# Patient Record
Sex: FEMALE | Employment: UNEMPLOYED | URBAN - METROPOLITAN AREA
[De-identification: names, ages, dates, MRNs, and addresses within clinical notes are randomized per-mention and may not be internally consistent; named-entity substitution may affect disease eponyms.]

---

## 2017-10-06 PROBLEM — I49.9 IRREGULAR HEART RATE: Status: ACTIVE | Noted: 2017-10-06

## 2017-10-06 PROBLEM — I95.9 HYPOTENSION: Status: ACTIVE | Noted: 2017-10-06

## 2017-10-06 PROBLEM — K57.30 DIVERTICULOSIS OF LARGE INTESTINE: Status: ACTIVE | Noted: 2017-10-06

## 2018-06-05 PROBLEM — L81.9 DISCOLORATION OF SKIN OF FOOT: Status: ACTIVE | Noted: 2018-06-05

## 2019-05-30 ENCOUNTER — TELEPHONE (OUTPATIENT)
Dept: PRIMARY CARE CLINIC | Age: 59
End: 2019-05-30

## 2019-05-30 RX ORDER — AMOXICILLIN 500 MG/1
500 CAPSULE ORAL 3 TIMES DAILY
Qty: 30 CAPSULE | Refills: 0 | Status: SHIPPED | OUTPATIENT
Start: 2019-05-30 | End: 2019-05-30

## 2019-05-30 NOTE — TELEPHONE ENCOUNTER
Dr. Garcia Catching pt. Started with a sore throat on Mon. Slight sore throat now from sinus drainage, facial pain and pressure. No fever. Asking if something can be sent in, or what you suggest? [phone].  Last appt 6/5/18

## 2019-05-30 NOTE — TELEPHONE ENCOUNTER
Amoxicillin sent to [facility]. Also do nasal saline 2x/day. Also recommend she make an appointment for check up.

## 2019-07-15 ENCOUNTER — OFFICE VISIT (OUTPATIENT)
Dept: PRIMARY CARE CLINIC | Age: 59
End: 2019-07-15
Payer: COMMERCIAL

## 2019-07-15 VITALS
HEIGHT: 68 IN | WEIGHT: 176.4 LBS | OXYGEN SATURATION: 98 % | DIASTOLIC BLOOD PRESSURE: 76 MMHG | BODY MASS INDEX: 26.73 KG/M2 | HEART RATE: 76 BPM | SYSTOLIC BLOOD PRESSURE: 128 MMHG

## 2019-07-15 DIAGNOSIS — I49.9 IRREGULAR HEART RATE: ICD-10-CM

## 2019-07-15 DIAGNOSIS — I49.3 FREQUENT PVCS: ICD-10-CM

## 2019-07-15 DIAGNOSIS — Z12.31 ENCOUNTER FOR SCREENING MAMMOGRAM FOR MALIGNANT NEOPLASM OF BREAST: ICD-10-CM

## 2019-07-15 DIAGNOSIS — Z13.220 ENCOUNTER FOR LIPID SCREENING FOR CARDIOVASCULAR DISEASE: ICD-10-CM

## 2019-07-15 DIAGNOSIS — Z13.6 ENCOUNTER FOR LIPID SCREENING FOR CARDIOVASCULAR DISEASE: ICD-10-CM

## 2019-07-15 DIAGNOSIS — R55 NEAR SYNCOPE: Primary | ICD-10-CM

## 2019-07-15 DIAGNOSIS — N95.1 MENOPAUSAL SYMPTOMS: ICD-10-CM

## 2019-07-15 PROCEDURE — 99214 OFFICE O/P EST MOD 30 MIN: CPT | Performed by: FAMILY MEDICINE

## 2019-07-15 PROCEDURE — 3017F COLORECTAL CA SCREEN DOC REV: CPT | Performed by: FAMILY MEDICINE

## 2019-07-15 PROCEDURE — G8419 CALC BMI OUT NRM PARAM NOF/U: HCPCS | Performed by: FAMILY MEDICINE

## 2019-07-15 PROCEDURE — 1036F TOBACCO NON-USER: CPT | Performed by: FAMILY MEDICINE

## 2019-07-15 PROCEDURE — G8427 DOCREV CUR MEDS BY ELIG CLIN: HCPCS | Performed by: FAMILY MEDICINE

## 2019-07-15 RX ORDER — IMIPRAMINE HCL 50 MG
50 TABLET ORAL NIGHTLY
Qty: 90 TABLET | Refills: 3 | Status: SHIPPED | OUTPATIENT
Start: 2019-07-15 | End: 2020-06-26 | Stop reason: SDUPTHER

## 2019-07-15 RX ORDER — METOPROLOL SUCCINATE 50 MG/1
25 TABLET, EXTENDED RELEASE ORAL DAILY
Qty: 45 TABLET | Refills: 3 | Status: SHIPPED | OUTPATIENT
Start: 2019-07-15 | End: 2020-08-11

## 2019-07-15 ASSESSMENT — ENCOUNTER SYMPTOMS
RHINORRHEA: 0
EYE DISCHARGE: 0
NAUSEA: 0
WHEEZING: 0
COUGH: 0
ABDOMINAL PAIN: 0
DIARRHEA: 0
SHORTNESS OF BREATH: 0
SORE THROAT: 0
EYE REDNESS: 0
VOMITING: 0

## 2019-07-15 ASSESSMENT — PATIENT HEALTH QUESTIONNAIRE - PHQ9
SUM OF ALL RESPONSES TO PHQ QUESTIONS 1-9: 2
SUM OF ALL RESPONSES TO PHQ9 QUESTIONS 1 & 2: 2
SUM OF ALL RESPONSES TO PHQ QUESTIONS 1-9: 2
1. LITTLE INTEREST OR PLEASURE IN DOING THINGS: 1
2. FEELING DOWN, DEPRESSED OR HOPELESS: 1

## 2019-07-15 NOTE — PROGRESS NOTES
time.   Skin: Skin is warm. No rash noted. Psychiatric: She has a normal mood and affect. Her behavior is normal. Thought content normal.   Nursing note and vitals reviewed. Assessment:       Diagnosis Orders   1. Near syncope  Basic Metabolic Panel, Fasting    CBC Auto Differential   2. Encounter for lipid screening for cardiovascular disease  Lipid, Fasting   3. Irregular heart rate  TSH    T4, Free   4. Encounter for screening mammogram for malignant neoplasm of breast  KARRIE DIGITAL SCREEN W OR WO CAD BILATERAL   5. Frequent PVCs  metoprolol succinate (TOPROL XL) 50 MG extended release tablet   6. Menopausal symptoms  DEXA BONE DENSITY AXIAL SKELETON        Plan:    Labs ordered. Mammogram ordered. DEXA scan ordered. She had work-up before for rapid heartbeat. If near syncope recurs, will repeat echo, 24-hour Holter monitor, and carotid ultrasound    Return in about 6 months (around 1/15/2020).     Orders Placed This Encounter   Procedures    KARRIE DIGITAL SCREEN W OR WO CAD BILATERAL     Standing Status:   Future     Standing Expiration Date:   9/15/2020     Order Specific Question:   Reason for exam:     Answer:   mammogram decline    DEXA BONE DENSITY AXIAL SKELETON     Standing Status:   Future     Standing Expiration Date:   7/15/2020     Order Specific Question:   Reason for exam:     Answer:   screen    Basic Metabolic Panel, Fasting     Standing Status:   Future     Standing Expiration Date:   7/15/2020    Lipid, Fasting     Standing Status:   Future     Standing Expiration Date:   7/15/2020    TSH     Standing Status:   Future     Standing Expiration Date:   7/15/2020    T4, Free     Standing Status:   Future     Standing Expiration Date:   7/15/2020    CBC Auto Differential     Standing Status:   Future     Standing Expiration Date:   7/15/2020     Orders Placed This Encounter   Medications    imipramine (TOFRANIL) 50 MG tablet     Sig: Take 1 tablet by mouth nightly     Dispense:  90

## 2019-07-16 LAB
ABSOLUTE BASO #: 0 K/UL (ref 0–0.1)
ABSOLUTE EOS #: 0.1 K/UL (ref 0.1–0.4)
ABSOLUTE LYMPH #: 1.2 K/UL (ref 0.8–5.2)
ABSOLUTE MONO #: 0.3 K/UL (ref 0.1–0.9)
ABSOLUTE NEUT #: 2.8 K/UL (ref 1.3–9.1)
ANION GAP SERPL CALCULATED.3IONS-SCNC: 10 MMOL/L (ref 4–12)
BASOPHILS RELATIVE PERCENT: 0.7 %
BUN BLDV-MCNC: 15 MG/DL (ref 5–23)
CALCIUM SERPL-MCNC: 9.7 MG/DL (ref 8.5–10.5)
CHLORIDE BLD-SCNC: 102 MMOL/L (ref 98–109)
CHOLESTEROL/HDL RATIO: 4.3 (ref 1–5)
CHOLESTEROL: 238 MG/DL (ref 150–200)
CO2: 26 MMOL/L (ref 22–32)
CREAT SERPL-MCNC: 0.76 MG/DL (ref 0.4–1)
EGFR AFRICAN AMERICAN: >60 ML/MIN/1.73SQ.M
EGFR IF NONAFRICAN AMERICAN: >60 ML/MIN/1.73SQ.M
EOSINOPHILS RELATIVE PERCENT: 2.4 %
GLUCOSE: 102 MG/DL (ref 65–99)
HCT VFR BLD CALC: 37.8 % (ref 36–48)
HDLC SERPL-MCNC: 56 MG/DL
HEMOGLOBIN: 13.2 G/DL (ref 12–16)
LDL CHOLESTEROL CALCULATED: 162 MG/DL
LYMPHOCYTE %: 27.3 %
MCH RBC QN AUTO: 32.1 PG (ref 27–34)
MCHC RBC AUTO-ENTMCNC: 34.9 G/DL (ref 31–36)
MCV RBC AUTO: 92 FL (ref 80–100)
MONOCYTES # BLD: 6.9 %
NEUTROPHILS RELATIVE PERCENT: 62.5 %
PDW BLD-RTO: 12.5 % (ref 10.8–14.8)
PLATELETS: 211 K/UL (ref 150–450)
POTASSIUM SERPL-SCNC: 4.3 MMOL/L (ref 3.5–5)
RBC: 4.11 M/UL (ref 4–5.5)
SODIUM BLD-SCNC: 138 MMOL/L (ref 134–146)
T4 FREE: 0.72 NG/DL (ref 0.61–1.6)
TRIGL SERPL-MCNC: 100 MG/DL (ref 27–150)
TSH SERPL DL<=0.05 MIU/L-ACNC: 1.48 UIU/ML (ref 0.49–4.67)
VLDLC SERPL CALC-MCNC: 20 MG/DL (ref 0–30)
WBC: 4.5 K/UL (ref 3.7–10.8)

## 2019-07-17 DIAGNOSIS — E78.00 PURE HYPERCHOLESTEROLEMIA: Primary | ICD-10-CM

## 2019-08-27 ENCOUNTER — PROCEDURE VISIT (OUTPATIENT)
Dept: PRIMARY CARE CLINIC | Age: 59
End: 2019-08-27
Payer: COMMERCIAL

## 2019-08-27 DIAGNOSIS — N95.1 MENOPAUSAL SYMPTOMS: ICD-10-CM

## 2019-08-27 PROCEDURE — 77080 DXA BONE DENSITY AXIAL: CPT | Performed by: FAMILY MEDICINE

## 2020-01-21 ENCOUNTER — OFFICE VISIT (OUTPATIENT)
Dept: PRIMARY CARE CLINIC | Age: 60
End: 2020-01-21
Payer: COMMERCIAL

## 2020-01-21 ENCOUNTER — HOSPITAL ENCOUNTER (OUTPATIENT)
Age: 60
Setting detail: SPECIMEN
Discharge: HOME | End: 2020-01-21
Payer: COMMERCIAL

## 2020-01-21 VITALS
WEIGHT: 178.8 LBS | BODY MASS INDEX: 27.15 KG/M2 | HEART RATE: 66 BPM | SYSTOLIC BLOOD PRESSURE: 112 MMHG | DIASTOLIC BLOOD PRESSURE: 84 MMHG

## 2020-01-21 LAB
ABSOLUTE EOS #: 0.13 K/UL (ref 0–0.44)
ABSOLUTE IMMATURE GRANULOCYTE: <0.03 K/UL (ref 0–0.3)
ABSOLUTE LYMPH #: 1.42 K/UL (ref 1.1–3.7)
ABSOLUTE MONO #: 0.56 K/UL (ref 0.1–1.2)
ALBUMIN SERPL-MCNC: 4.7 G/DL (ref 3.5–5.2)
ALBUMIN/GLOBULIN RATIO: 1.4 (ref 1–2.5)
ALP BLD-CCNC: 72 U/L (ref 35–104)
ALT SERPL-CCNC: 22 U/L (ref 5–33)
AMYLASE: 42 U/L (ref 28–100)
AST SERPL-CCNC: 24 U/L
BASOPHILS # BLD: 1 % (ref 0–2)
BASOPHILS ABSOLUTE: 0.04 K/UL (ref 0–0.2)
BILIRUB SERPL-MCNC: 0.62 MG/DL (ref 0.3–1.2)
BILIRUBIN DIRECT: 0.13 MG/DL
BILIRUBIN, INDIRECT: 0.49 MG/DL (ref 0–1)
DIFFERENTIAL TYPE: ABNORMAL
EOSINOPHILS RELATIVE PERCENT: 2 % (ref 1–4)
GLOBULIN: NORMAL G/DL (ref 1.5–3.8)
HCT VFR BLD CALC: 39.8 % (ref 36.3–47.1)
HEMOGLOBIN: 13.6 G/DL (ref 11.9–15.1)
IMMATURE GRANULOCYTES: 0 %
LIPASE: 23 U/L (ref 13–60)
LYMPHOCYTES # BLD: 19 % (ref 24–43)
MCH RBC QN AUTO: 31.9 PG (ref 25.2–33.5)
MCHC RBC AUTO-ENTMCNC: 34.2 G/DL (ref 28.4–34.8)
MCV RBC AUTO: 93.4 FL (ref 82.6–102.9)
MONOCYTES # BLD: 8 % (ref 3–12)
NRBC AUTOMATED: 0 PER 100 WBC
PDW BLD-RTO: 12.2 % (ref 11.8–14.4)
PLATELET # BLD: 247 K/UL (ref 138–453)
PLATELET ESTIMATE: ABNORMAL
PMV BLD AUTO: 10.3 FL (ref 8.1–13.5)
RBC # BLD: 4.26 M/UL (ref 3.95–5.11)
RBC # BLD: ABNORMAL 10*6/UL
SEG NEUTROPHILS: 70 % (ref 36–65)
SEGMENTED NEUTROPHILS ABSOLUTE COUNT: 5.33 K/UL (ref 1.5–8.1)
TOTAL PROTEIN: 8 G/DL (ref 6.4–8.3)
WBC # BLD: 7.5 K/UL (ref 3.5–11.3)
WBC # BLD: ABNORMAL 10*3/UL

## 2020-01-21 PROCEDURE — G8427 DOCREV CUR MEDS BY ELIG CLIN: HCPCS | Performed by: PHYSICIAN ASSISTANT

## 2020-01-21 PROCEDURE — 99213 OFFICE O/P EST LOW 20 MIN: CPT | Performed by: PHYSICIAN ASSISTANT

## 2020-01-21 PROCEDURE — G8419 CALC BMI OUT NRM PARAM NOF/U: HCPCS | Performed by: PHYSICIAN ASSISTANT

## 2020-01-21 PROCEDURE — G8484 FLU IMMUNIZE NO ADMIN: HCPCS | Performed by: PHYSICIAN ASSISTANT

## 2020-01-21 PROCEDURE — 3017F COLORECTAL CA SCREEN DOC REV: CPT | Performed by: PHYSICIAN ASSISTANT

## 2020-01-21 PROCEDURE — 1036F TOBACCO NON-USER: CPT | Performed by: PHYSICIAN ASSISTANT

## 2020-01-21 RX ORDER — OMEPRAZOLE 20 MG/1
20 CAPSULE, DELAYED RELEASE ORAL DAILY
Qty: 30 CAPSULE | Refills: 1 | Status: SHIPPED | OUTPATIENT
Start: 2020-01-21 | End: 2020-03-09 | Stop reason: SDUPTHER

## 2020-01-21 ASSESSMENT — PATIENT HEALTH QUESTIONNAIRE - PHQ9
1. LITTLE INTEREST OR PLEASURE IN DOING THINGS: 0
SUM OF ALL RESPONSES TO PHQ QUESTIONS 1-9: 0
SUM OF ALL RESPONSES TO PHQ QUESTIONS 1-9: 0
2. FEELING DOWN, DEPRESSED OR HOPELESS: 0
SUM OF ALL RESPONSES TO PHQ9 QUESTIONS 1 & 2: 0

## 2020-01-21 NOTE — PROGRESS NOTES
Substance Use Topics    Alcohol use: Yes     Comment: ocassioanly      Current Outpatient Medications   Medication Sig Dispense Refill    omeprazole (PRILOSEC) 20 MG delayed release capsule Take 1 capsule by mouth Daily 30 capsule 1    imipramine (TOFRANIL) 50 MG tablet Take 1 tablet by mouth nightly 90 tablet 3    metoprolol succinate (TOPROL XL) 50 MG extended release tablet Take 0.5 tablets by mouth daily 45 tablet 3    timolol (TIMOPTIC-XE) 0.5 % ophthalmic gel-forming Apply to eye      SUMAtriptan (IMITREX) 25 MG tablet Take 25 mg by mouth once as needed for Migraine       No current facility-administered medications for this visit. Allergies   Allergen Reactions    Meperidine     Morphine        Health Maintenance   Topic Date Due    DTaP/Tdap/Td vaccine (1 - Tdap) 07/12/1972    Diabetes screen  07/12/2001    Shingles Vaccine (1 of 2) 07/12/2011    Breast cancer screen  08/26/2018    Flu vaccine (1) 01/21/2021 (Originally 9/1/2019)    Lipid screen  07/15/2024    Colon cancer screen colonoscopy  01/30/2027    Hepatitis C screen  Completed    HIV screen  Completed    Pneumococcal 0-64 years Vaccine  Aged Out       Subjective:      Review of Systems   Constitutional: Negative for fatigue. Gastrointestinal: Positive for abdominal pain and nausea. Negative for blood in stool, diarrhea and vomiting. Objective:     /84 (Site: Left Upper Arm, Position: Sitting, Cuff Size: Large Adult)   Pulse 66   Wt 178 lb 12.8 oz (81.1 kg)   BMI 27.15 kg/m²   Physical Exam  Vitals signs and nursing note reviewed. Constitutional:       Appearance: Normal appearance. HENT:      Head: Normocephalic and atraumatic. Cardiovascular:      Rate and Rhythm: Normal rate and regular rhythm. Pulmonary:      Effort: Pulmonary effort is normal.      Breath sounds: Normal breath sounds. Abdominal:      General: Abdomen is flat. Bowel sounds are normal. There is no distension.       Tenderness:

## 2020-01-27 ASSESSMENT — ENCOUNTER SYMPTOMS
NAUSEA: 1
DIARRHEA: 0
BLOOD IN STOOL: 0
ABDOMINAL PAIN: 1
VOMITING: 0

## 2020-03-09 ENCOUNTER — OFFICE VISIT (OUTPATIENT)
Dept: PRIMARY CARE CLINIC | Age: 60
End: 2020-03-09
Payer: COMMERCIAL

## 2020-03-09 VITALS
TEMPERATURE: 98 F | HEART RATE: 80 BPM | SYSTOLIC BLOOD PRESSURE: 134 MMHG | WEIGHT: 179 LBS | OXYGEN SATURATION: 99 % | DIASTOLIC BLOOD PRESSURE: 82 MMHG | BODY MASS INDEX: 27.18 KG/M2

## 2020-03-09 PROBLEM — B02.9 HERPES ZOSTER WITHOUT COMPLICATION: Status: ACTIVE | Noted: 2020-03-09

## 2020-03-09 PROBLEM — H02.053: Status: ACTIVE | Noted: 2018-03-22

## 2020-03-09 PROBLEM — H26.9 CATARACTS, BILATERAL: Status: ACTIVE | Noted: 2018-04-20

## 2020-03-09 PROCEDURE — 3017F COLORECTAL CA SCREEN DOC REV: CPT | Performed by: PHYSICIAN ASSISTANT

## 2020-03-09 PROCEDURE — 99213 OFFICE O/P EST LOW 20 MIN: CPT | Performed by: PHYSICIAN ASSISTANT

## 2020-03-09 PROCEDURE — G8419 CALC BMI OUT NRM PARAM NOF/U: HCPCS | Performed by: PHYSICIAN ASSISTANT

## 2020-03-09 PROCEDURE — G8484 FLU IMMUNIZE NO ADMIN: HCPCS | Performed by: PHYSICIAN ASSISTANT

## 2020-03-09 PROCEDURE — 1036F TOBACCO NON-USER: CPT | Performed by: PHYSICIAN ASSISTANT

## 2020-03-09 PROCEDURE — G8427 DOCREV CUR MEDS BY ELIG CLIN: HCPCS | Performed by: PHYSICIAN ASSISTANT

## 2020-03-09 RX ORDER — OMEPRAZOLE 20 MG/1
20 CAPSULE, DELAYED RELEASE ORAL DAILY
Qty: 90 CAPSULE | Refills: 1 | Status: SHIPPED | OUTPATIENT
Start: 2020-03-09 | End: 2020-03-13

## 2020-03-09 RX ORDER — VALACYCLOVIR HYDROCHLORIDE 1 G/1
1000 TABLET, FILM COATED ORAL 3 TIMES DAILY
Qty: 21 TABLET | Refills: 0 | Status: SHIPPED | OUTPATIENT
Start: 2020-03-09 | End: 2021-06-07 | Stop reason: ALTCHOICE

## 2020-03-09 ASSESSMENT — ENCOUNTER SYMPTOMS
GASTROINTESTINAL NEGATIVE: 1
RESPIRATORY NEGATIVE: 1

## 2020-03-09 NOTE — PROGRESS NOTES
[address]  Phone: [phone]  Fax: [phone]    Patricia Mon is a 63 y.o. female who presents today for her medical conditions/complaintsas noted below. Chief Complaint   Patient presents with    Rash     patient has a rash that started on her left side of stomach and spread down to left thigh, rash started 2 weeks ago. patient said the rash is painful, patient thinks she may have shingles. HPI:     HPI  Rash started Saturday and has been worn down about 3-4 days before that  Did have chickenpox as a child  No other exposures  NO one else has this around her. No fever. Current Outpatient Medications   Medication Sig Dispense Refill    omeprazole (PRILOSEC) 20 MG delayed release capsule Take 1 capsule by mouth Daily 90 capsule 1    valACYclovir (VALTREX) 1 g tablet Take 1 tablet by mouth 3 times daily For shingles 21 tablet 0    imipramine (TOFRANIL) 50 MG tablet Take 1 tablet by mouth nightly 90 tablet 3    metoprolol succinate (TOPROL XL) 50 MG extended release tablet Take 0.5 tablets by mouth daily 45 tablet 3    timolol (TIMOPTIC-XE) 0.5 % ophthalmic gel-forming Apply to eye      SUMAtriptan (IMITREX) 25 MG tablet Take 25 mg by mouth once as needed for Migraine       No current facility-administered medications for this visit. Allergies   Allergen Reactions    Meperidine     Morphine        Subjective:      Review of Systems   Constitutional: Negative for chills, diaphoresis and fever. Respiratory: Negative. Cardiovascular: Negative. Gastrointestinal: Negative. Genitourinary: Negative. Skin: Positive for rash. Objective:     /82   Pulse 80   Temp 98 °F (36.7 °C)   Wt 179 lb (81.2 kg)   SpO2 99%   BMI 27.18 kg/m²   Physical Exam  Vitals signs and nursing note reviewed. Constitutional:       Appearance: Normal appearance.    Cardiovascular:      Rate and Rhythm: Normal

## 2020-03-13 RX ORDER — OMEPRAZOLE 20 MG/1
CAPSULE, DELAYED RELEASE ORAL
Qty: 30 CAPSULE | Refills: 1 | Status: SHIPPED | OUTPATIENT
Start: 2020-03-13 | End: 2020-06-22 | Stop reason: SDUPTHER

## 2020-06-23 RX ORDER — OMEPRAZOLE 20 MG/1
CAPSULE, DELAYED RELEASE ORAL
Qty: 30 CAPSULE | Refills: 1 | Status: SHIPPED | OUTPATIENT
Start: 2020-06-23 | End: 2021-06-07

## 2020-06-26 RX ORDER — IMIPRAMINE HCL 50 MG
50 TABLET ORAL NIGHTLY
Qty: 90 TABLET | Refills: 3 | Status: SHIPPED | OUTPATIENT
Start: 2020-06-26 | End: 2021-06-13 | Stop reason: DRUGHIGH

## 2020-08-11 RX ORDER — METOPROLOL SUCCINATE 50 MG/1
TABLET, EXTENDED RELEASE ORAL
Qty: 45 TABLET | Refills: 3 | Status: SHIPPED | OUTPATIENT
Start: 2020-08-11 | End: 2021-06-07 | Stop reason: SDUPTHER

## 2020-11-13 ENCOUNTER — TELEPHONE (OUTPATIENT)
Dept: PRIMARY CARE CLINIC | Age: 60
End: 2020-11-13

## 2020-11-13 RX ORDER — FLUCONAZOLE 150 MG/1
150 TABLET ORAL ONCE
Qty: 1 TABLET | Refills: 0 | Status: SHIPPED | OUTPATIENT
Start: 2020-11-13 | End: 2020-11-13

## 2020-11-13 NOTE — TELEPHONE ENCOUNTER
Left voicemail notifying patient that medications were sent in - to schedule an appointment if sxs don't get worse.

## 2020-11-13 NOTE — TELEPHONE ENCOUNTER
Pt went to the urgent care on 11/1/2020 for a cat bite and was put a antibiotic. Pt states she now has a yeast infection. Pt c/o itching and burning x2 days. Pt would like something sent in     Please advise     [facility].

## 2021-06-07 ENCOUNTER — HOSPITAL ENCOUNTER (OUTPATIENT)
Age: 61
Setting detail: SPECIMEN
Discharge: HOME | End: 2021-06-07
Payer: COMMERCIAL

## 2021-06-07 ENCOUNTER — OFFICE VISIT (OUTPATIENT)
Dept: PRIMARY CARE CLINIC | Age: 61
End: 2021-06-07
Payer: COMMERCIAL

## 2021-06-07 VITALS
BODY MASS INDEX: 27.12 KG/M2 | OXYGEN SATURATION: 98 % | HEART RATE: 73 BPM | DIASTOLIC BLOOD PRESSURE: 82 MMHG | WEIGHT: 178.6 LBS | SYSTOLIC BLOOD PRESSURE: 118 MMHG

## 2021-06-07 DIAGNOSIS — Z13.1 SCREENING FOR DIABETES MELLITUS: ICD-10-CM

## 2021-06-07 DIAGNOSIS — I49.3 FREQUENT PVCS: Primary | ICD-10-CM

## 2021-06-07 DIAGNOSIS — E78.00 HIGH CHOLESTEROL: ICD-10-CM

## 2021-06-07 DIAGNOSIS — Z12.31 ENCOUNTER FOR SCREENING MAMMOGRAM FOR BREAST CANCER: ICD-10-CM

## 2021-06-07 DIAGNOSIS — R53.83 FATIGUE, UNSPECIFIED TYPE: ICD-10-CM

## 2021-06-07 DIAGNOSIS — I49.3 FREQUENT PVCS: ICD-10-CM

## 2021-06-07 DIAGNOSIS — Z13.31 POSITIVE DEPRESSION SCREENING: ICD-10-CM

## 2021-06-07 DIAGNOSIS — F32.A DEPRESSION, UNSPECIFIED DEPRESSION TYPE: ICD-10-CM

## 2021-06-07 DIAGNOSIS — R41.3 MEMORY DIFFICULTIES: ICD-10-CM

## 2021-06-07 DIAGNOSIS — G43.709 CHRONIC MIGRAINE WITHOUT AURA WITHOUT STATUS MIGRAINOSUS, NOT INTRACTABLE: ICD-10-CM

## 2021-06-07 DIAGNOSIS — Z23 NEED FOR VACCINATION: ICD-10-CM

## 2021-06-07 LAB
ABSOLUTE EOS #: 0.12 K/UL (ref 0–0.44)
ABSOLUTE IMMATURE GRANULOCYTE: <0.03 K/UL (ref 0–0.3)
ABSOLUTE LYMPH #: 1.51 K/UL (ref 1.1–3.7)
ABSOLUTE MONO #: 0.36 K/UL (ref 0.1–1.2)
ALBUMIN SERPL-MCNC: 4.8 G/DL (ref 3.5–5.2)
ALBUMIN/GLOBULIN RATIO: 1.5 (ref 1–2.5)
ALP BLD-CCNC: 74 U/L (ref 35–104)
ALT SERPL-CCNC: 21 U/L (ref 5–33)
ANION GAP SERPL CALCULATED.3IONS-SCNC: 9 MMOL/L (ref 9–17)
AST SERPL-CCNC: 24 U/L
BASOPHILS # BLD: 0 % (ref 0–2)
BASOPHILS ABSOLUTE: <0.03 K/UL (ref 0–0.2)
BILIRUB SERPL-MCNC: 0.72 MG/DL (ref 0.3–1.2)
BUN BLDV-MCNC: 13 MG/DL (ref 6–20)
BUN/CREAT BLD: ABNORMAL (ref 9–20)
CALCIUM SERPL-MCNC: 9.5 MG/DL (ref 8.6–10.4)
CHLORIDE BLD-SCNC: 99 MMOL/L (ref 98–107)
CHOLESTEROL, FASTING: 236 MG/DL
CHOLESTEROL/HDL RATIO: 4.6
CO2: 25 MMOL/L (ref 20–31)
CREAT SERPL-MCNC: 0.65 MG/DL (ref 0.5–0.9)
DIFFERENTIAL TYPE: NORMAL
EOSINOPHILS RELATIVE PERCENT: 2 % (ref 1–4)
FOLATE: >20 NG/ML
GFR AFRICAN AMERICAN: >60 ML/MIN
GFR NON-AFRICAN AMERICAN: >60 ML/MIN
GFR SERPL CREATININE-BSD FRML MDRD: ABNORMAL ML/MIN/{1.73_M2}
GFR SERPL CREATININE-BSD FRML MDRD: ABNORMAL ML/MIN/{1.73_M2}
GLUCOSE FASTING: 91 MG/DL (ref 70–99)
HCT VFR BLD CALC: 41.5 % (ref 36.3–47.1)
HDLC SERPL-MCNC: 51 MG/DL
HEMOGLOBIN: 14.1 G/DL (ref 11.9–15.1)
IMMATURE GRANULOCYTES: 0 %
LDL CHOLESTEROL: 163 MG/DL (ref 0–130)
LYMPHOCYTES # BLD: 30 % (ref 24–43)
MAGNESIUM: 2 MG/DL (ref 1.6–2.6)
MCH RBC QN AUTO: 31.6 PG (ref 25.2–33.5)
MCHC RBC AUTO-ENTMCNC: 34 G/DL (ref 28.4–34.8)
MCV RBC AUTO: 93 FL (ref 82.6–102.9)
MONOCYTES # BLD: 7 % (ref 3–12)
NRBC AUTOMATED: 0 PER 100 WBC
PDW BLD-RTO: 12.6 % (ref 11.8–14.4)
PLATELET # BLD: 229 K/UL (ref 138–453)
PLATELET ESTIMATE: NORMAL
PMV BLD AUTO: 10.2 FL (ref 8.1–13.5)
POTASSIUM SERPL-SCNC: 4.5 MMOL/L (ref 3.7–5.3)
RBC # BLD: 4.46 M/UL (ref 3.95–5.11)
RBC # BLD: NORMAL 10*6/UL
SEG NEUTROPHILS: 61 % (ref 36–65)
SEGMENTED NEUTROPHILS ABSOLUTE COUNT: 2.96 K/UL (ref 1.5–8.1)
SODIUM BLD-SCNC: 133 MMOL/L (ref 135–144)
TOTAL PROTEIN: 8.1 G/DL (ref 6.4–8.3)
TRIGLYCERIDE, FASTING: 112 MG/DL
TSH SERPL DL<=0.05 MIU/L-ACNC: 1.53 MIU/L (ref 0.3–5)
VITAMIN B-12: 546 PG/ML (ref 232–1245)
VITAMIN D 25-HYDROXY: 33.1 NG/ML (ref 30–100)
VLDLC SERPL CALC-MCNC: ABNORMAL MG/DL (ref 1–30)
WBC # BLD: 5 K/UL (ref 3.5–11.3)
WBC # BLD: NORMAL 10*3/UL

## 2021-06-07 PROCEDURE — 3017F COLORECTAL CA SCREEN DOC REV: CPT | Performed by: PHYSICIAN ASSISTANT

## 2021-06-07 PROCEDURE — 90471 IMMUNIZATION ADMIN: CPT | Performed by: PHYSICIAN ASSISTANT

## 2021-06-07 PROCEDURE — 99214 OFFICE O/P EST MOD 30 MIN: CPT | Performed by: PHYSICIAN ASSISTANT

## 2021-06-07 PROCEDURE — 1036F TOBACCO NON-USER: CPT | Performed by: PHYSICIAN ASSISTANT

## 2021-06-07 PROCEDURE — G8427 DOCREV CUR MEDS BY ELIG CLIN: HCPCS | Performed by: PHYSICIAN ASSISTANT

## 2021-06-07 PROCEDURE — 90715 TDAP VACCINE 7 YRS/> IM: CPT | Performed by: PHYSICIAN ASSISTANT

## 2021-06-07 PROCEDURE — G8431 POS CLIN DEPRES SCRN F/U DOC: HCPCS | Performed by: PHYSICIAN ASSISTANT

## 2021-06-07 PROCEDURE — G8419 CALC BMI OUT NRM PARAM NOF/U: HCPCS | Performed by: PHYSICIAN ASSISTANT

## 2021-06-07 RX ORDER — IMIPRAMINE HCL 50 MG
50 TABLET ORAL NIGHTLY
Qty: 90 TABLET | Refills: 3 | Status: CANCELLED | OUTPATIENT
Start: 2021-06-07 | End: 2021-09-05

## 2021-06-07 RX ORDER — IMIPRAMINE PAMOATE 75 MG
75 CAPSULE ORAL NIGHTLY
Qty: 90 CAPSULE | Refills: 1 | Status: SHIPPED | OUTPATIENT
Start: 2021-06-07 | End: 2021-11-26 | Stop reason: SDUPTHER

## 2021-06-07 RX ORDER — METOPROLOL SUCCINATE 25 MG/1
12.5 TABLET, EXTENDED RELEASE ORAL DAILY
Qty: 45 TABLET | Refills: 3 | Status: SHIPPED | OUTPATIENT
Start: 2021-06-07 | End: 2021-11-26 | Stop reason: SDUPTHER

## 2021-06-07 RX ORDER — SUMATRIPTAN 25 MG/1
25 TABLET, FILM COATED ORAL
Qty: 27 TABLET | Refills: 3 | Status: SHIPPED | OUTPATIENT
Start: 2021-06-07 | End: 2021-11-26 | Stop reason: SDUPTHER

## 2021-06-07 SDOH — ECONOMIC STABILITY: FOOD INSECURITY: WITHIN THE PAST 12 MONTHS, THE FOOD YOU BOUGHT JUST DIDN'T LAST AND YOU DIDN'T HAVE MONEY TO GET MORE.: NEVER TRUE

## 2021-06-07 SDOH — ECONOMIC STABILITY: FOOD INSECURITY: WITHIN THE PAST 12 MONTHS, YOU WORRIED THAT YOUR FOOD WOULD RUN OUT BEFORE YOU GOT MONEY TO BUY MORE.: NEVER TRUE

## 2021-06-07 ASSESSMENT — PATIENT HEALTH QUESTIONNAIRE - PHQ9
2. FEELING DOWN, DEPRESSED OR HOPELESS: 2
SUM OF ALL RESPONSES TO PHQ9 QUESTIONS 1 & 2: 3
7. TROUBLE CONCENTRATING ON THINGS, SUCH AS READING THE NEWSPAPER OR WATCHING TELEVISION: 1
9. THOUGHTS THAT YOU WOULD BE BETTER OFF DEAD, OR OF HURTING YOURSELF: 0
8. MOVING OR SPEAKING SO SLOWLY THAT OTHER PEOPLE COULD HAVE NOTICED. OR THE OPPOSITE, BEING SO FIGETY OR RESTLESS THAT YOU HAVE BEEN MOVING AROUND A LOT MORE THAN USUAL: 1
SUM OF ALL RESPONSES TO PHQ QUESTIONS 1-9: 11
4. FEELING TIRED OR HAVING LITTLE ENERGY: 3
SUM OF ALL RESPONSES TO PHQ QUESTIONS 1-9: 11
5. POOR APPETITE OR OVEREATING: 0
1. LITTLE INTEREST OR PLEASURE IN DOING THINGS: 1
6. FEELING BAD ABOUT YOURSELF - OR THAT YOU ARE A FAILURE OR HAVE LET YOURSELF OR YOUR FAMILY DOWN: 0
10. IF YOU CHECKED OFF ANY PROBLEMS, HOW DIFFICULT HAVE THESE PROBLEMS MADE IT FOR YOU TO DO YOUR WORK, TAKE CARE OF THINGS AT HOME, OR GET ALONG WITH OTHER PEOPLE: 1
3. TROUBLE FALLING OR STAYING ASLEEP: 3
SUM OF ALL RESPONSES TO PHQ QUESTIONS 1-9: 11
DEPRESSION UNABLE TO ASSESS: URGENT/EMERGENT SITUATION

## 2021-06-07 ASSESSMENT — ENCOUNTER SYMPTOMS: SHORTNESS OF BREATH: 0

## 2021-06-07 ASSESSMENT — SOCIAL DETERMINANTS OF HEALTH (SDOH): HOW HARD IS IT FOR YOU TO PAY FOR THE VERY BASICS LIKE FOOD, HOUSING, MEDICAL CARE, AND HEATING?: NOT HARD AT ALL

## 2021-06-07 NOTE — PROGRESS NOTES
[facility]  [address] [place] [id number]  Dept: [id number] Tomasz is a 62 y.o. female Established patient, who presents today for her medical conditions/complaints as noted below. Chief Complaint   Patient presents with    Medication Check       HPI:     HPI   HX of PVCs/irregular HR:   Complains that she thinks her BP gets too low (around 99/75) with the metoprolol, pulse . Denies lightheadedness, just feels weak when she takes it. Says she drinks lot of water. Fatigue:  Not feeling well, not herself x 6 months. Trouble sleeping every night, trouble staying asleep, tosses and turn after 2 hours. Claims she only slept 2 hrs last night. Sometimes watches TV before bed. Snoring, but not loudly, no wintnessed apnea. Did not think melatonin helped. No hx of anemia, no blood in stool. Drinks almond milk with cereal, Taking 2000 IU of Vit D. Does not go in the sun since she has hx of skin cancer (sees dermatology 2x per year). Feels depressed because she is not feeling well. She is also depressed because her  is an alcoholic and her brother who lives next door to her is very negative. Pt said her Mother had Alzheimer's disease. Pt says she notices she has decreased memory, such as forgetting people's names. Migraines:  Takes imipramine for migraine prevention. Gets them less than once per month usually. Had 2 in one month a couple months ago, but imitrex works well to abort them. Gets black spots in vision as aura. Nausea if the migraines are bad. Takes tums for GERD 4x per week, but she is not will to take anything else for GERD since Zantac was pulled from the market (so she thinks all other acid meds are dangerous too). Not interested in COVID vaccine and Shingrix is too expensive.      Reviewed prior notes None  Reviewed previous Labs from 2019 and 2020    LDL Cholesterol (mg/dL) 06/07/2026    DTaP/Tdap/Td vaccine (2 - Td or Tdap) 06/07/2031    Hepatitis C screen  Completed    HIV screen  Completed    Hepatitis A vaccine  Aged Out    Hepatitis B vaccine  Aged Out    Hib vaccine  Aged Out    Meningococcal (ACWY) vaccine  Aged Out    Pneumococcal 0-64 years Vaccine  Aged Out       Subjective:      Review of Systems   Constitutional: Positive for fatigue. Respiratory: Negative for shortness of breath. Cardiovascular: Positive for palpitations (once per week). Negative for chest pain. Gastrointestinal:        +GERD   Neurological: Positive for weakness (generalized) and headaches. Negative for light-headedness. Psychiatric/Behavioral: Positive for dysphoric mood and sleep disturbance. Objective:     /82   Pulse 73   Wt 178 lb 9.6 oz (81 kg)   SpO2 98%   BMI 27.12 kg/m²   Physical Exam  Vitals and nursing note reviewed. Constitutional:       Appearance: Normal appearance. HENT:      Head: Normocephalic and atraumatic. Neck:      Vascular: No carotid bruit. Cardiovascular:      Rate and Rhythm: Normal rate and regular rhythm. Comments: Trace edema bilat  Pulmonary:      Effort: Pulmonary effort is normal.      Breath sounds: Normal breath sounds. Neurological:      Mental Status: She is alert. Assessment/Plan:   1. Frequent PVCs  -     metoprolol succinate (TOPROL XL) 25 MG extended release tablet; Take 0.5 tablets by mouth daily, Disp-45 tablet, R-3Requesting 1 year supplyNormal  (Pt thinks the metoprolol causes her to have low BP and weakness, so will try decreasing dose from 25 mg to 12.5 mg daily, but advised pt to let office know if palpitations became too frequent with dosage change). -     TSH With Reflex Ft4; Future  -     Magnesium; Future  -     Comprehensive Metabolic Panel, Fasting; Future  2. Fatigue, unspecified type  -     TSH With Reflex Ft4; Future  -     CBC Auto Differential; Future  -     Vitamin D 25 Hydroxy;  Future  - Vitamin B12 & Folate; Future  3. Chronic migraine without aura without status migrainosus, not intractable  -     SUMAtriptan (IMITREX) 25 MG tablet; Take 1 tablet by mouth once as needed for Migraine, Disp-27 tablet, R-3Normal  -     imipramine (TOFRANIL-PM) 75 MG capsule; Take 1 capsule by mouth nightly, Disp-90 capsule, R-1Normal  4. Depression, unspecified depression type  -     imipramine (TOFRANIL-PM) 75 MG capsule; Take 1 capsule by mouth nightly, Disp-90 capsule, R-1Normal  Pt did not want any new medications, but was willing to try increasing the imipramine from 50 mg to 75 mg  5. Positive depression screening  -     Positive Screen for Clinical Depression with a Documented Follow-up Plan   6. Memory difficulties  -     Vitamin B12 & Folate; Future  7. High cholesterol  -     Lipid, Fasting; Future   -Pt's Father had a MI at age 61  -     Comprehensive Metabolic Panel, Fasting; Future  8. Screening for diabetes mellitus  -     Comprehensive Metabolic Panel, Fasting; Future  9. Encounter for screening mammogram for breast cancer  -     [facility] IRIS DIGITAL SCREEN BILATERAL; Future  10. Need for vaccination  -     Tdap (age 6y and older) IM ([address])       On the basis of positive PHQ-9 screening (PHQ-9 Total Score: 11), the following plan was implemented: Dosage increase of imipramine. Patient will follow-up in 6 month(s) with PCP or sooner as needed. Return in about 6 months (around 12/7/2021) for PVCs, depression, cholesterol [facility].     Orders Placed This Encounter   Procedures    KARRIE IRIS DIGITAL SCREEN BILATERAL     Standing Status:   Future     Standing Expiration Date:   6/7/2022     Order Specific Question:   Reason for exam:     Answer:   screening, Paternal aunt had breast cancer in her 52's    Tdap (age 6y and older) IM ([address])    TSH With Reflex Ft4     Standing Status:   Future     Number of Occurrences:   1     Standing Expiration Date:   6/7/2022    CBC Auto Differential     Standing Status:   Future     Number of Occurrences:   1     Standing Expiration Date:   6/8/2022    Vitamin D 25 Hydroxy     Standing Status:   Future     Number of Occurrences:   1     Standing Expiration Date:   6/7/2022    Vitamin B12 & Folate     Standing Status:   Future     Number of Occurrences:   1     Standing Expiration Date:   6/7/2022    Lipid, Fasting     Standing Status:   Future     Number of Occurrences:   1     Standing Expiration Date:   6/7/2022    Magnesium     Standing Status:   Future     Number of Occurrences:   1     Standing Expiration Date:   6/7/2022    Comprehensive Metabolic Panel, Fasting     Standing Status:   Future     Number of Occurrences:   1     Standing Expiration Date:   6/7/2022    Positive Screen for Clinical Depression with a Documented Follow-up Plan      Orders Placed This Encounter   Medications    metoprolol succinate (TOPROL XL) 25 MG extended release tablet     Sig: Take 0.5 tablets by mouth daily     Dispense:  45 tablet     Refill:  3     Requesting 1 year supply    SUMAtriptan (IMITREX) 25 MG tablet     Sig: Take 1 tablet by mouth once as needed for Migraine     Dispense:  27 tablet     Refill:  3    imipramine (TOFRANIL-PM) 75 MG capsule     Sig: Take 1 capsule by mouth nightly     Dispense:  90 capsule     Refill:  1       Patient given educational materials - see patient instructions. Discussed use, benefit, and side effects of prescribed medications. All patient questions answered. Pt voiced understanding. Reviewed health maintenance. Instructed to continue current medications, diet and exercise. Patient agreed with treatment plan. Follow up as directed.      Electronically signed by Leena Mendoza PA-C on 6/13/2021 at 11:37 PM

## 2021-06-13 PROBLEM — K21.9 GERD (GASTROESOPHAGEAL REFLUX DISEASE): Status: ACTIVE | Noted: 2021-06-13

## 2021-06-13 PROBLEM — E78.00 HIGH CHOLESTEROL: Status: ACTIVE | Noted: 2021-06-13

## 2021-06-13 PROBLEM — I49.3 FREQUENT PVCS: Status: ACTIVE | Noted: 2021-06-13

## 2021-06-13 PROBLEM — G43.709 CHRONIC MIGRAINE WITHOUT AURA WITHOUT STATUS MIGRAINOSUS, NOT INTRACTABLE: Status: ACTIVE | Noted: 2021-06-13

## 2021-06-13 PROBLEM — F32.A DEPRESSION: Status: ACTIVE | Noted: 2021-06-13

## 2021-06-13 RX ORDER — CHOLECALCIFEROL (VITAMIN D3) 50 MCG
2000 TABLET ORAL DAILY
COMMUNITY

## 2021-07-30 ENCOUNTER — TELEPHONE (OUTPATIENT)
Dept: FAMILY MEDICINE CLINIC | Age: 61
End: 2021-07-30

## 2021-10-29 ENCOUNTER — OFFICE VISIT (OUTPATIENT)
Dept: PRIMARY CARE CLINIC | Age: 61
End: 2021-10-29

## 2021-10-29 ENCOUNTER — NURSE TRIAGE (OUTPATIENT)
Dept: OTHER | Facility: CLINIC | Age: 61
End: 2021-10-29

## 2021-10-29 ENCOUNTER — TELEPHONE (OUTPATIENT)
Dept: PRIMARY CARE CLINIC | Age: 61
End: 2021-10-29

## 2021-10-29 VITALS
SYSTOLIC BLOOD PRESSURE: 132 MMHG | HEIGHT: 67 IN | HEART RATE: 86 BPM | DIASTOLIC BLOOD PRESSURE: 80 MMHG | BODY MASS INDEX: 28.88 KG/M2 | OXYGEN SATURATION: 98 % | WEIGHT: 184 LBS

## 2021-10-29 DIAGNOSIS — M79.604 PAIN OF RIGHT LOWER EXTREMITY: ICD-10-CM

## 2021-10-29 PROCEDURE — 99213 OFFICE O/P EST LOW 20 MIN: CPT | Performed by: FAMILY MEDICINE

## 2021-10-29 ASSESSMENT — ENCOUNTER SYMPTOMS
COUGH: 0
SHORTNESS OF BREATH: 0

## 2021-10-29 NOTE — TELEPHONE ENCOUNTER
----- Message from Erica Pina Dr sent at 10/29/2021 11:42 AM EDT -----  Subject: Appointment Request    Reason for Call: Semi-Routine Return from RN Triage    QUESTIONS  Type of Appointment? Established Patient  Reason for appointment request? No appointments available during search  Additional Information for Provider? Nurse triaged PT needs see today   (Friday) or monday for RT calf pain. No appts available, please contact   the pt to advise  ---------------------------------------------------------------------------  --------------  CALL BACK INFO  What is the best way for the office to contact you? OK to leave message on   voicemail  Preferred Call Back Phone Number? [phone]  ---------------------------------------------------------------------------  --------------  SCRIPT ANSWERS  Patient needs to be seen today? No  Patient needs to be seen today or tomorrow? No  Patient needs to be seen within 3 days? No  Patient needs to be seen within 5 days? Yes   Patient can be seen for a routine visit? No  Nurse Name? Emma Garner Nurse king  Have you been diagnosed with, awaiting test results for, or told that you   are suspected of having COVID-19 (Coronavirus)? (If patient has tested   negative or was tested as a requirement for work, school, or travel and   not based on symptoms, answer no)? No  Within the past two weeks have you developed any of the following symptoms   (answer no if symptoms have been present longer than 2 weeks or began   more than 2 weeks ago)? Fever or Chills, Cough, Shortness of breath or   difficulty breathing, Loss of taste or smell, Sore throat, Nasal   congestion, Sneezing or runny nose, Fatigue or generalized body aches   (answer no if pain is specific to a body part e.g. back pain), Diarrhea,   Headache? No  Have you had close contact with someone with COVID-19 in the last 14 days? No  (Service Expert  click yes below to proceed with [employer]   Scheduling)?  Yes

## 2021-10-29 NOTE — TELEPHONE ENCOUNTER
Reason for Disposition   Thigh or calf pain in only one leg and present > 1 hour    Answer Assessment - Initial Assessment Questions  1. MAIN CONCERN OR SYMPTOM:  \"What is your main concern right now? \" \"What question do you have? \" \"What's the main symptom you're worried about? \" (e.g., fever, pain, redness, swelling)      Right leg pain, no swelling, no redness. 2. VACCINE: \"What vaccination did you receive? \" \"Is this your first or second shot? \" (e.g., none; AstraZeneca, J&J, Gosia Maloney, Dameon Morales, other)      10/20 Moderna     3. SYMPTOM ONSET: \"When did the pain begin? \" (e.g., not relevant; hours, days)       Day after vaccine, she had stabbing pain in leg. On and off pain. 4. SYMPTOM SEVERITY: \"How bad is it? \"       Feels like a muscle strain 2/10    5. FEVER: \"Is there a fever? \" If so, ask: \"What is it, how was it measured, and when did it start? \"       No     6. PAST REACTIONS: \"Have you reacted to immunizations before? \" If so, ask: \"What happened? \"      No     7. OTHER SYMPTOMS: \"Do you have any other symptoms? \"      No    Answer Assessment - Initial Assessment Questions  1. ONSET: \"When did the pain start? \"       10/21    2. LOCATION: \"Where is the pain located? \"       Right leg calf. 3. PAIN: \"How bad is the pain? \"    (Scale 1-10; or mild, moderate, severe)    -  MILD (1-3): doesn't interfere with normal activities     -  MODERATE (4-7): interferes with normal activities (e.g., work or school) or awakens from sleep, limping     -  SEVERE (8-10): excruciating pain, unable to do any normal activities, unable to walk      2/10    4. WORK OR EXERCISE: \"Has there been any recent work or exercise that involved this part of the body? \"       No     5. CAUSE: \"What do you think is causing the leg pain? \"      Covid Vaccine    6. OTHER SYMPTOMS: \"Do you have any other symptoms? \" (e.g., chest pain, back pain, breathing difficulty, swelling, rash, fever, numbness, weakness)      No       7.  PREGNANCY: \"Is there any chance you are pregnant? \" \"When was your last menstrual period? \"      No    Protocols used: LEG PAIN-ADULT-OH, CORONAVIRUS (COVID-19) VACCINE QUESTIONS AND REACTIONS-ADULT-AH    Received call from [address] at [facility] with [employer] Complaint. Brief description of triage: Calf pain. Triage indicates for patient to be seen today. Reaching out to office for 2nd level triage, speaking with Dyana Larson and approved office visit today or Monday. She can use heat or gentle stretching. Care advice provided, patient verbalizes understanding; denies any other questions or concerns; instructed to call back for any new or worsening symptoms. Writer provided warm transfer to Ravin hyatt at [facility] for appointment scheduling. Attention Provider: Thank you for allowing me to participate in the care of your patient. The patient was connected to triage in response to information provided to the ECC/PSC. Please do not respond through this encounter as the response is not directed to a shared pool.

## 2021-10-29 NOTE — TELEPHONE ENCOUNTER
----- Message from Madvenue Dk sent at 10/29/2021  2:03 PM EDT -----  Subject: Appointment Request    Reason for Call: Urgent (Patient Request) No Script    QUESTIONS  Type of Appointment? Established Patient  Reason for appointment request? Available appointments did not meet   patient need  Additional Information for Provider? Pt would like to be seen today. Pt   was transferred to [facility] this morning complaining of right leg pain. Pt has   been having right leg pain for 9 days since she had her second Maderna   shot. She started getting it that evening that she received the shot. Pt   did go to the [facility] and thought that this could possibly   be a blood clot, but would need an order from her doctor. Pt did not want   to schedule the earliest appt which is on Monday. Pt would like to be seen   today if possible.  ---------------------------------------------------------------------------  --------------  CALL BACK INFO  What is the best way for the office to contact you? OK to leave message on   voicemail  Preferred Call Back Phone Number? [phone]  ---------------------------------------------------------------------------  --------------  SCRIPT ANSWERS  Relationship to Patient? Self  (Is the patient requesting to see the provider for a procedure?)? No  (Is the patient requesting to see the provider urgently  today or   tomorrow. )? Yes  Have you been diagnosed with, awaiting test results for, or told that you   are suspected of having COVID-19 (Coronavirus)? (If patient has tested   negative or was tested as a requirement for work, school, or travel and   not based on symptoms, answer no)? No  Within the past two weeks have you developed any of the following symptoms   (answer no if symptoms have been present longer than 2 weeks or began   more than 2 weeks ago)?  Fever or Chills, Cough, Shortness of breath or   difficulty breathing, Loss of taste or smell, Sore throat, Nasal   congestion, Sneezing or runny nose, Fatigue or generalized body aches   (answer no if pain is specific to a body part e.g. back pain), Diarrhea,   Headache? No  Have you had close contact with someone with COVID-19 in the last 14 days? No  (Service Expert  click yes below to proceed with [employer] [employer]   Scheduling)?  Yes

## 2021-10-29 NOTE — PROGRESS NOTES
[address] [place] [address] PRIMARY CARE  [address] [place] [id number]  Dept: [facility] Tomasz is a 62 y.o. female Established patient, who presents today for her medical conditions/complaints as noted below. Chief Complaint   Patient presents with    Leg Pain     Right leg calf pain x 9 days. No redness or edema        HPI:     HPI- Started with leg pain after her second Moderna vaccine. Woke up with sharp pain in calf the day after. Would come and go and still aches in the muscle. No injury to leg and no other changes.       Reviewed prior notes None  Reviewed previous Imaging    LDL Cholesterol (mg/dL)   Date Value   06/07/2021 163 (H)     LDL Calculated (mg/dL)   Date Value   07/15/2019 162 (H)       (goal LDL is <100)   AST (U/L)   Date Value   06/07/2021 24     ALT (U/L)   Date Value   06/07/2021 21     BUN (mg/dL)   Date Value   06/07/2021 13     TSH (mIU/L)   Date Value   06/07/2021 1.53     BP Readings from Last 3 Encounters:   10/29/21 132/80   06/07/21 118/82   03/09/20 134/82          (goal 120/80)    Past Medical History:   Diagnosis Date    Chest pain     Diverticulitis     Diverticulosis     Endometriosis     Skin cancer       Past Surgical History:   Procedure Laterality Date    HYSTERECTOMY, VAGINAL  2009       Family History   Problem Relation Age of Onset    Alzheimer's Disease Mother     Cancer Father         stomach    Heart Attack Father 61    Cancer Sister        Social History     Tobacco Use    Smoking status: Never Smoker    Smokeless tobacco: Never Used   Substance Use Topics    Alcohol use: Yes     Comment: ocassioanly      Current Outpatient Medications   Medication Sig Dispense Refill    vitamin D (CHOLECALCIFEROL) 50 MCG (2000 UT) TABS tablet Take 2,000 Units by mouth daily      metoprolol succinate (TOPROL XL) 25 MG extended release tablet Take 0.5 tablets by mouth daily 45 tablet 3    SUMAtriptan (IMITREX) 25 MG tablet Take 1 tablet by mouth once as needed for Migraine 27 tablet 3    imipramine (TOFRANIL-PM) 75 MG capsule Take 1 capsule by mouth nightly 90 capsule 1    timolol (TIMOPTIC-XE) 0.5 % ophthalmic gel-forming Apply to eye       No current facility-administered medications for this visit. Allergies   Allergen Reactions    Meperidine     Morphine        Health Maintenance   Topic Date Due    Breast cancer screen  08/26/2018    Flu vaccine (1) Never done    Shingles Vaccine (1 of 2) 06/13/2022 (Originally 7/12/2011)    COVID-19 Vaccine (1) 06/13/2022 (Originally 7/12/1972)    Colon cancer screen colonoscopy  01/30/2022    Lipid screen  06/07/2026    DTaP/Tdap/Td vaccine (2 - Td or Tdap) 06/07/2031    Hepatitis C screen  Completed    HIV screen  Completed    Hepatitis A vaccine  Aged Out    Hepatitis B vaccine  Aged Out    Hib vaccine  Aged Out    Meningococcal (ACWY) vaccine  Aged Out    Pneumococcal 0-64 years Vaccine  Aged Out       Subjective:      Review of Systems   Constitutional: Negative for chills and fever. Respiratory: Negative for cough and shortness of breath. Cardiovascular: Negative for chest pain and palpitations. Neurological: Negative for dizziness and light-headedness. Objective:     /80   Pulse 86   Ht 5' 7\" (1.702 m)   Wt 184 lb (83.5 kg)   SpO2 98%   BMI 28.82 kg/m²   Physical Exam  Vitals and nursing note reviewed. Constitutional:       Appearance: Normal appearance. HENT:      Head: Normocephalic and atraumatic. Eyes:      General: No scleral icterus. Right eye: No discharge. Conjunctiva/sclera: Conjunctivae normal.   Pulmonary:      Effort: Pulmonary effort is normal. No respiratory distress. Skin:     Coloration: Skin is not jaundiced or pale. Findings: No erythema or rash. Neurological:      General: No focal deficit present. Mental Status: She is alert.    Psychiatric:         Mood and Affect: Mood normal. Behavior: Behavior normal.         Thought Content: Thought content normal.         Assessment/Plan:   1. Pain of right lower extremity  Assessment & Plan:   check venous doppler- right LE to r/o clot  Orders:  -     US DUP LOWER EXTREMITY RIGHT ION; Future       Return if symptoms worsen or fail to improve. Orders Placed This Encounter   Procedures    US DUP LOWER EXTREMITY RIGHT ION     Standing Status:   Future     Standing Expiration Date:   10/29/2022     No orders of the defined types were placed in this encounter. Patient given educational materials - see patient instructions. Discussed use, benefit, and side effects of prescribed medications. All patient questions answered. Pt voiced understanding. Reviewed health maintenance. Instructed to continue current medications, diet and exercise. Patient agreed with treatment plan. Follow up as directed.      Electronically signed by Jennyfer Watts MD on 10/29/2021 at 4:41 PM

## 2021-11-01 ENCOUNTER — HOSPITAL ENCOUNTER (OUTPATIENT)
Dept: VASCULAR LAB | Age: 61
Discharge: HOME | End: 2021-11-01

## 2021-11-01 DIAGNOSIS — M79.604 PAIN OF RIGHT LOWER EXTREMITY: ICD-10-CM

## 2021-11-01 PROCEDURE — 93971 EXTREMITY STUDY: CPT

## 2021-11-26 DIAGNOSIS — F32.A DEPRESSION, UNSPECIFIED DEPRESSION TYPE: ICD-10-CM

## 2021-11-26 DIAGNOSIS — G43.709 CHRONIC MIGRAINE WITHOUT AURA WITHOUT STATUS MIGRAINOSUS, NOT INTRACTABLE: ICD-10-CM

## 2021-11-26 DIAGNOSIS — R10.13 EPIGASTRIC ABDOMINAL PAIN: ICD-10-CM

## 2021-11-26 DIAGNOSIS — I49.3 FREQUENT PVCS: ICD-10-CM

## 2021-11-26 RX ORDER — TIMOLOL MALEATE 5 MG/ML
1 SOLUTION OPHTHALMIC NIGHTLY
Qty: 1 EACH | Refills: 3 | Status: SHIPPED | OUTPATIENT
Start: 2021-11-26

## 2021-11-26 RX ORDER — IMIPRAMINE PAMOATE 75 MG
75 CAPSULE ORAL NIGHTLY
Qty: 90 CAPSULE | Refills: 1 | Status: SHIPPED | OUTPATIENT
Start: 2021-11-26 | End: 2021-12-01

## 2021-11-26 RX ORDER — OMEPRAZOLE 20 MG/1
20 CAPSULE, DELAYED RELEASE ORAL DAILY
Qty: 90 CAPSULE | Refills: 3 | Status: SHIPPED | OUTPATIENT
Start: 2021-11-26

## 2021-11-26 RX ORDER — METOPROLOL SUCCINATE 25 MG/1
12.5 TABLET, EXTENDED RELEASE ORAL DAILY
Qty: 45 TABLET | Refills: 3 | Status: SHIPPED | OUTPATIENT
Start: 2021-11-26 | End: 2022-10-13 | Stop reason: SDUPTHER

## 2021-11-26 RX ORDER — SUMATRIPTAN 25 MG/1
25 TABLET, FILM COATED ORAL
Qty: 27 TABLET | Refills: 3 | Status: SHIPPED | OUTPATIENT
Start: 2021-11-26 | End: 2022-03-14

## 2021-11-30 ENCOUNTER — PATIENT MESSAGE (OUTPATIENT)
Dept: PRIMARY CARE CLINIC | Age: 61
End: 2021-11-30

## 2021-12-01 RX ORDER — AMITRIPTYLINE HYDROCHLORIDE 25 MG/1
25 TABLET, FILM COATED ORAL NIGHTLY
Qty: 30 TABLET | Refills: 5 | Status: SHIPPED | OUTPATIENT
Start: 2021-12-01 | End: 2021-12-27

## 2021-12-01 NOTE — TELEPHONE ENCOUNTER
Once again, no pharmacy listed.   Amitriptyline sent into [facility] in [facility]

## 2021-12-27 ENCOUNTER — NURSE TRIAGE (OUTPATIENT)
Dept: OTHER | Facility: CLINIC | Age: 61
End: 2021-12-27

## 2021-12-27 ENCOUNTER — OFFICE VISIT (OUTPATIENT)
Dept: PRIMARY CARE CLINIC | Age: 61
End: 2021-12-27

## 2021-12-27 ENCOUNTER — TELEPHONE (OUTPATIENT)
Dept: PRIMARY CARE CLINIC | Age: 61
End: 2021-12-27

## 2021-12-27 VITALS
SYSTOLIC BLOOD PRESSURE: 120 MMHG | TEMPERATURE: 97.9 F | BODY MASS INDEX: 28.82 KG/M2 | HEART RATE: 88 BPM | OXYGEN SATURATION: 99 % | DIASTOLIC BLOOD PRESSURE: 84 MMHG | HEIGHT: 67 IN | WEIGHT: 183.6 LBS

## 2021-12-27 DIAGNOSIS — J02.9 PHARYNGITIS, UNSPECIFIED ETIOLOGY: Primary | ICD-10-CM

## 2021-12-27 DIAGNOSIS — R52 BODY ACHES: ICD-10-CM

## 2021-12-27 LAB — S PYO AG THROAT QL: NORMAL

## 2021-12-27 PROCEDURE — 99213 OFFICE O/P EST LOW 20 MIN: CPT | Performed by: PHYSICIAN ASSISTANT

## 2021-12-27 PROCEDURE — 1036F TOBACCO NON-USER: CPT | Performed by: PHYSICIAN ASSISTANT

## 2021-12-27 PROCEDURE — 3017F COLORECTAL CA SCREEN DOC REV: CPT | Performed by: PHYSICIAN ASSISTANT

## 2021-12-27 PROCEDURE — G8484 FLU IMMUNIZE NO ADMIN: HCPCS | Performed by: PHYSICIAN ASSISTANT

## 2021-12-27 PROCEDURE — G8419 CALC BMI OUT NRM PARAM NOF/U: HCPCS | Performed by: PHYSICIAN ASSISTANT

## 2021-12-27 PROCEDURE — G8427 DOCREV CUR MEDS BY ELIG CLIN: HCPCS | Performed by: PHYSICIAN ASSISTANT

## 2021-12-27 PROCEDURE — 87880 STREP A ASSAY W/OPTIC: CPT | Performed by: PHYSICIAN ASSISTANT

## 2021-12-27 RX ORDER — AMITRIPTYLINE HYDROCHLORIDE 25 MG/1
TABLET, FILM COATED ORAL
Qty: 30 TABLET | Refills: 5 | Status: SHIPPED | OUTPATIENT
Start: 2021-12-27 | End: 2022-03-14

## 2021-12-27 RX ORDER — HYDROCORTISONE
POWDER (GRAM) MISCELLANEOUS
Qty: 240 EACH | Refills: 0 | Status: SHIPPED | OUTPATIENT
Start: 2021-12-27 | End: 2021-12-29 | Stop reason: SDUPTHER

## 2021-12-27 ASSESSMENT — ENCOUNTER SYMPTOMS
DIARRHEA: 0
SINUS PAIN: 1
NAUSEA: 1
SORE THROAT: 1
VOMITING: 0

## 2021-12-27 NOTE — TELEPHONE ENCOUNTER
Patient walked into the office today and states she did not realize that the RX Fabiano Mallory PA-C gave her was a compound medication and she did not want to wait and have it filled at THE [facility] drug because that is far away from her house. I spoke to Fabiano Mallory about this and she said to advise the patient to either fill the script or she can use chloraseptic spray OTC since this is viral.     I advised the patient of this- the patient grabbed the script out of my hand and left the office.

## 2021-12-27 NOTE — TELEPHONE ENCOUNTER
The compound medication you sent for her is over $1400 she is asking for something else. She is asking for an AB. Please advise.

## 2021-12-27 NOTE — PROGRESS NOTES
[address]  [address]  Phone: [phone]  Fax: [phone]    Terra Mcmillan is a 62 y.o. female who presents today for her medical conditions/complaintsas noted below. Chief Complaint   Patient presents with    Sinus Problem     pt states started yesterday    Pharyngitis    Nasal Congestion         HPI:     HPI  Started yesterday with nasal congestion and sore throat.  is also sick   No recent exposure to other sick people   Had first two vaccinations in October/November    Current Outpatient Medications   Medication Sig Dispense Refill    amitriptyline (ELAVIL) 25 MG tablet TAKE 1 TABLET BY MOUTH EVERY DAY AT NIGHT 30 tablet 5    metoprolol succinate (TOPROL XL) 25 MG extended release tablet Take 0.5 tablets by mouth daily 45 tablet 3    omeprazole (PRILOSEC) 20 MG delayed release capsule Take 1 capsule by mouth Daily 90 capsule 3    timolol (TIMOPTIC-XE) 0.5 % ophthalmic gel-forming Place 1 drop into both eyes nightly 1 each 3    vitamin D (CHOLECALCIFEROL) 50 MCG (2000 UT) TABS tablet Take 2,000 Units by mouth daily      SUMAtriptan (IMITREX) 25 MG tablet Take 1 tablet by mouth once as needed for Migraine 27 tablet 3     No current facility-administered medications for this visit. Allergies   Allergen Reactions    Meperidine     Morphine        Subjective:      Review of Systems   Constitutional: Positive for fatigue. Negative for chills, diaphoresis and fever. HENT: Positive for congestion, postnasal drip, sinus pain and sore throat. Gastrointestinal: Positive for nausea. Negative for diarrhea and vomiting. Musculoskeletal: Positive for arthralgias and myalgias. Neurological: Negative for headaches. Objective:     /84   Pulse 88   Temp 97.9 °F (36.6 °C) (Temporal)   Ht 5' 7\" (1.702 m)   Wt 183 lb 9.6 oz (83.3 kg)   SpO2 99%   BMI 28.76 kg/m²   Physical Exam  Vitals and nursing note reviewed. Constitutional:       Appearance: Normal appearance. She is ill-appearing. HENT:      Right Ear: Tympanic membrane, ear canal and external ear normal.      Left Ear: Tympanic membrane, ear canal and external ear normal.      Nose: Congestion present. Mouth/Throat:      Pharynx: Posterior oropharyngeal erythema present. No oropharyngeal exudate. Eyes:      General:         Right eye: No discharge. Left eye: No discharge. Conjunctiva/sclera: Conjunctivae normal.      Pupils: Pupils are equal, round, and reactive to light. Cardiovascular:      Rate and Rhythm: Normal rate and regular rhythm. Heart sounds: Normal heart sounds. Pulmonary:      Effort: Pulmonary effort is normal.      Breath sounds: Normal breath sounds. No wheezing, rhonchi or rales. Musculoskeletal:      Cervical back: Normal range of motion. Skin:     Findings: No rash. Neurological:      Mental Status: She is alert and oriented to person, place, and time. Psychiatric:         Mood and Affect: Mood is anxious. Assessment:       Diagnosis Orders   1. Pharyngitis, unspecified etiology  POCT rapid strep A    COVID-19   2. Body aches  POCT rapid strep A    COVID-19        Plan:    Strep test  Covid Test if above is negative  Salt water gargles  Klipp[facility]'s at [facility], if desired  Ibuprofen for pain  Quarantine util test results back    No follow-ups on file. Orders Placed This Encounter   Procedures    COVID-19     Standing Status:   Future     Standing Expiration Date:   12/27/2022     Scheduling Instructions:      1) Due to current limited availability of the COVID-19 test, tests will be prioritized based on responses to questions above. Testing may be delayed due to volume.             2) Print and instruct patient to adhere to CDC home isolation program. (Link Above)              3) Set up or refer patient for a monitoring program.              4) Have patient sign up for and leverage [employer] (if not previously done). Order Specific Question:   Is this test for diagnosis or screening? Answer:   Diagnosis of ill patient     Order Specific Question:   Symptomatic for COVID-19 as defined by CDC? Answer:   Yes     Order Specific Question:   Date of Symptom Onset     Answer:   12/26/2021     Order Specific Question:   Hospitalized for COVID-19? Answer:   No     Order Specific Question:   Admitted to ICU for COVID-19? Answer:   No     Order Specific Question:   Employed in healthcare setting? Answer:   No     Order Specific Question:   Resident in a congregate (group) care setting? Answer:   No     Order Specific Question:   Pregnant? Answer:   No     Order Specific Question:   Previously tested for COVID-19? Answer:   Unknown    POCT rapid strep A     No orders of the defined types were placed in this encounter.           Electronically signed by Reece Claude, PA-Con 12/27/2021 at 11:23 AM

## 2021-12-27 NOTE — TELEPHONE ENCOUNTER
We did explain getting that script filled while she was here in [facility] for help with the sore throat. We will have to wait to see what Covid test shows before considering an oral ABX.

## 2021-12-28 LAB
SARS-COV-2: NORMAL
SARS-COV-2: NOT DETECTED
SOURCE: NORMAL

## 2021-12-29 ENCOUNTER — PATIENT MESSAGE (OUTPATIENT)
Dept: PRIMARY CARE CLINIC | Age: 61
End: 2021-12-29

## 2021-12-29 RX ORDER — CEFDINIR 300 MG/1
CAPSULE ORAL
Qty: 20 CAPSULE | Refills: 0 | Status: SHIPPED | OUTPATIENT
Start: 2021-12-29 | End: 2022-03-14

## 2021-12-29 RX ORDER — HYDROCORTISONE
POWDER (GRAM) MISCELLANEOUS
Qty: 240 EACH | Refills: 0 | Status: SHIPPED | OUTPATIENT
Start: 2021-12-29

## 2022-03-14 ENCOUNTER — OFFICE VISIT (OUTPATIENT)
Dept: PRIMARY CARE CLINIC | Age: 62
End: 2022-03-14
Payer: COMMERCIAL

## 2022-03-14 VITALS
OXYGEN SATURATION: 100 % | HEIGHT: 67 IN | SYSTOLIC BLOOD PRESSURE: 120 MMHG | HEART RATE: 81 BPM | WEIGHT: 182.6 LBS | BODY MASS INDEX: 28.66 KG/M2 | DIASTOLIC BLOOD PRESSURE: 72 MMHG

## 2022-03-14 DIAGNOSIS — Z13.220 ENCOUNTER FOR LIPID SCREENING FOR CARDIOVASCULAR DISEASE: ICD-10-CM

## 2022-03-14 DIAGNOSIS — N39.3 STRESS INCONTINENCE, FEMALE: ICD-10-CM

## 2022-03-14 DIAGNOSIS — R35.0 URINARY FREQUENCY: Primary | ICD-10-CM

## 2022-03-14 DIAGNOSIS — Z12.11 ENCOUNTER FOR SCREENING FOR MALIGNANT NEOPLASM OF COLON: ICD-10-CM

## 2022-03-14 DIAGNOSIS — Z12.31 ENCOUNTER FOR SCREENING MAMMOGRAM FOR MALIGNANT NEOPLASM OF BREAST: ICD-10-CM

## 2022-03-14 DIAGNOSIS — Z13.6 ENCOUNTER FOR LIPID SCREENING FOR CARDIOVASCULAR DISEASE: ICD-10-CM

## 2022-03-14 DIAGNOSIS — Z00.00 ANNUAL PHYSICAL EXAM: ICD-10-CM

## 2022-03-14 LAB
BILIRUBIN, POC: NEGATIVE
BLOOD URINE, POC: NEGATIVE
CLARITY, POC: CLEAR
COLOR, POC: YELLOW
GLUCOSE URINE, POC: NEGATIVE
KETONES, POC: NEGATIVE
LEUKOCYTE EST, POC: ABNORMAL
NITRITE, POC: NEGATIVE
PH, POC: 6
PROTEIN, POC: NEGATIVE
SPECIFIC GRAVITY, POC: 1.01
UROBILINOGEN, POC: ABNORMAL

## 2022-03-14 PROCEDURE — 81003 URINALYSIS AUTO W/O SCOPE: CPT | Performed by: FAMILY MEDICINE

## 2022-03-14 PROCEDURE — 99213 OFFICE O/P EST LOW 20 MIN: CPT | Performed by: FAMILY MEDICINE

## 2022-03-14 RX ORDER — AMITRIPTYLINE HYDROCHLORIDE 25 MG/1
25 TABLET, FILM COATED ORAL NIGHTLY
Qty: 90 TABLET | Refills: 3 | Status: SHIPPED | OUTPATIENT
Start: 2022-03-14

## 2022-03-14 ASSESSMENT — ENCOUNTER SYMPTOMS
DIARRHEA: 0
VOMITING: 0
WHEEZING: 0
EYE REDNESS: 0
COUGH: 0
ABDOMINAL PAIN: 0
NAUSEA: 0
RHINORRHEA: 0
EYE DISCHARGE: 0
SORE THROAT: 0
SHORTNESS OF BREATH: 0

## 2022-03-14 NOTE — PROGRESS NOTES
[facility] PRIMARY CARE  [address] [place] [id number]  Dept: [facility] Tomasz is a 62 y.o. female Established patient, who presents today for her medical conditions/complaints as noted below. Chief Complaint   Patient presents with    Urinary Tract Infection     Pt c/o urinary frequency    Medication Check     Pt here today for med check for amitriptyline       HPI:     HPI  Here with complaint of urinary frequency for the past several weeks. Possible odor. Denies any blood. States been sometime since her last bladder infection. Has not been trying any medications. Patient last colonoscopy was 5 years ago. Patient states she had received a notice about due for her next one. Patient has not gotten her blood work done due to difficulties with [employer]. Patient states is also behind on her mammograms. Denies any fevers or chills. No lightheadedness or dizziness. Patient states amitriptyline has been helping well with her migraine headaches.     Reviewed prior notes None  Reviewed previous Labs    LDL Cholesterol (mg/dL)   Date Value   06/07/2021 163 (H)     LDL Calculated (mg/dL)   Date Value   07/15/2019 162 (H)       (goal LDL is <100)   AST (U/L)   Date Value   06/07/2021 24     ALT (U/L)   Date Value   06/07/2021 21     BUN (mg/dL)   Date Value   06/07/2021 13     TSH (mIU/L)   Date Value   06/07/2021 1.53     BP Readings from Last 3 Encounters:   03/14/22 120/72   12/27/21 120/84   10/29/21 132/80          (goal 120/80)    Past Medical History:   Diagnosis Date    Chest pain     Diverticulitis     Diverticulosis     Endometriosis     Skin cancer       Past Surgical History:   Procedure Laterality Date    HYSTERECTOMY, VAGINAL  2009       Family History   Problem Relation Age of Onset    Alzheimer's Disease Mother     Cancer Father         stomach    Heart Attack Father 61    Cancer Sister        Social History Tobacco Use    Smoking status: Never Smoker    Smokeless tobacco: Never Used   Substance Use Topics    Alcohol use: Yes     Comment: ocassioanly      Current Outpatient Medications   Medication Sig Dispense Refill    amitriptyline (ELAVIL) 25 MG tablet Take 1 tablet by mouth nightly 90 tablet 3    hydrocortisone POWD powder Hydrocortisone 80mg,Tetracycline, 1.5g, Elixir of Benedryl 240ml in equal parts. 1-1 1/2 tsp gargle for 2 minutes and swallow qid  each 0    metoprolol succinate (TOPROL XL) 25 MG extended release tablet Take 0.5 tablets by mouth daily 45 tablet 3    SUMAtriptan (IMITREX) 25 MG tablet Take 1 tablet by mouth once as needed for Migraine 27 tablet 3    omeprazole (PRILOSEC) 20 MG delayed release capsule Take 1 capsule by mouth Daily 90 capsule 3    timolol (TIMOPTIC-XE) 0.5 % ophthalmic gel-forming Place 1 drop into both eyes nightly 1 each 3    vitamin D (CHOLECALCIFEROL) 50 MCG (2000 UT) TABS tablet Take 2,000 Units by mouth daily       No current facility-administered medications for this visit. Allergies   Allergen Reactions    Meperidine     Morphine        Health Maintenance   Topic Date Due    Breast cancer screen  08/26/2018    Flu vaccine (1) 09/01/2021    Colorectal Cancer Screen  01/30/2022    Shingles Vaccine (1 of 2) 06/13/2022 (Originally 7/12/2011)    COVID-19 Vaccine (3 - Booster for Moderna series) 03/20/2022    Depression Monitoring  06/07/2022    Diabetes screen  06/07/2024    Lipid screen  06/07/2026    DTaP/Tdap/Td vaccine (2 - Td or Tdap) 06/07/2031    Hepatitis C screen  Completed    HIV screen  Completed    Hepatitis A vaccine  Aged Out    Hepatitis B vaccine  Aged Out    Hib vaccine  Aged Out    Meningococcal (ACWY) vaccine  Aged Out    Pneumococcal 0-64 years Vaccine  Aged Out       Subjective:      Review of Systems   Constitutional: Negative for chills and fever. HENT: Negative for rhinorrhea and sore throat.     Eyes: Negative for discharge and redness. Respiratory: Negative for cough, shortness of breath and wheezing. Cardiovascular: Negative for chest pain and palpitations. Gastrointestinal: Negative for abdominal pain, diarrhea, nausea and vomiting. Genitourinary: Positive for frequency. Negative for difficulty urinating, dysuria and hematuria. Musculoskeletal: Negative for arthralgias and myalgias. Neurological: Negative for dizziness, light-headedness and headaches. Psychiatric/Behavioral: Negative for sleep disturbance. Objective:     /72   Pulse 81   Ht 5' 7\" (1.702 m)   Wt 182 lb 9.6 oz (82.8 kg)   SpO2 100%   BMI 28.60 kg/m²   Physical Exam  Vitals and nursing note reviewed. Constitutional:       General: She is not in acute distress. Appearance: She is well-developed. She is not ill-appearing. HENT:      Head: Normocephalic and atraumatic. Right Ear: External ear normal.      Left Ear: External ear normal.   Eyes:      General: No scleral icterus. Right eye: No discharge. Left eye: No discharge. Conjunctiva/sclera: Conjunctivae normal.      Pupils: Pupils are equal, round, and reactive to light. Neck:      Thyroid: No thyromegaly. Trachea: No tracheal deviation. Cardiovascular:      Rate and Rhythm: Normal rate and regular rhythm. Heart sounds: Normal heart sounds. Pulmonary:      Effort: Pulmonary effort is normal. No respiratory distress. Breath sounds: Normal breath sounds. No wheezing. Lymphadenopathy:      Cervical: No cervical adenopathy. Skin:     General: Skin is warm. Findings: No rash. Neurological:      Mental Status: She is alert and oriented to person, place, and time. Psychiatric:         Mood and Affect: Mood normal.         Behavior: Behavior normal.         Thought Content: Thought content normal.         Assessment:       Diagnosis Orders   1.  Urinary frequency  POCT Urinalysis No Micro (Auto)    Culture, Urine 2. Encounter for screening for malignant neoplasm of colon  CARLA Dillon MD, Gastroenterology, [place]   3. Encounter for screening mammogram for malignant neoplasm of breast  [facility]E DIGITAL SCREEN W OR WO CAD BILATERAL   4. Annual physical exam  Basic Metabolic Panel, Fasting    Hepatic Function Panel   5. Encounter for lipid screening for cardiovascular disease  Lipid, Fasting   6. Stress incontinence, female          Plan:    Patient education Shingrix and Boostrix  Blood work ordered  Mammogram ordered  Referral to gastroenterology for colonoscopy  Urinalysis ordered  Renew amitriptyline  Urine sent for culture  Refer to urogynecologist when patient is ready for stress incontinence  Return in about 1 year (around 3/14/2023). Orders Placed This Encounter   Procedures    Culture, Urine     Standing Status:   Future     Standing Expiration Date:   3/14/2023     Order Specific Question:   Specify (ex-cath, midstream, cysto, etc)?      Answer:   clean catch    KARRIE DIGITAL SCREEN W OR WO CAD BILATERAL     Standing Status:   Future     Standing Expiration Date:   5/14/2023     Order Specific Question:   Reason for exam:     Answer:   screen    Lipid, Fasting     Standing Status:   Future     Standing Expiration Date:   3/14/2023    Basic Metabolic Panel, Fasting     Standing Status:   Future     Standing Expiration Date:   3/14/2023    Hepatic Function Panel     Standing Status:   Future     Standing Expiration Date:   3/14/2023    CARLA Dillon MD, Gastroenterology, [place]     Referral Priority:   Routine     Referral Type:   Eval and Treat     Referral Reason:   Specialty Services Required     Referred to Provider:   India Fritz MD     Requested Specialty:   Gastroenterology     Number of Visits Requested:   1    POCT Urinalysis No Micro (Auto)     Orders Placed This Encounter   Medications    amitriptyline (ELAVIL) 25 MG tablet     Sig: Take 1 tablet by mouth nightly     Dispense: 90 tablet     Refill:  3       Patient given educational materials - see patient instructions. Discussed use, benefit, and side effects of prescribed medications. All patient questions answered. Pt voiced understanding. Reviewed health maintenance. Instructed to continue current medications, diet andexercise. Patient agreed with treatment plan. Follow up as directed.      Electronicallysigned by Refugio Mtz MD on 3/14/2022 at 10:20 AM

## 2022-03-15 LAB
CULTURE: NORMAL
Lab: NORMAL
SPECIMEN DESCRIPTION: NORMAL

## 2022-10-12 RX ORDER — IMIPRAMINE HCL 50 MG
TABLET ORAL
Qty: 90 TABLET | Refills: 3 | Status: SHIPPED | OUTPATIENT
Start: 2022-10-12

## 2022-10-13 DIAGNOSIS — I49.3 FREQUENT PVCS: ICD-10-CM

## 2022-10-13 RX ORDER — METOPROLOL SUCCINATE 25 MG/1
12.5 TABLET, EXTENDED RELEASE ORAL DAILY
Qty: 45 TABLET | Refills: 3 | Status: SHIPPED | OUTPATIENT
Start: 2022-10-13 | End: 2022-10-13 | Stop reason: SDUPTHER

## 2022-10-13 RX ORDER — METOPROLOL SUCCINATE 25 MG/1
12.5 TABLET, EXTENDED RELEASE ORAL DAILY
Qty: 45 TABLET | Refills: 3 | Status: SHIPPED | OUTPATIENT
Start: 2022-10-13 | End: 2022-10-21 | Stop reason: SDUPTHER

## 2022-10-21 DIAGNOSIS — I49.3 FREQUENT PVCS: ICD-10-CM

## 2022-10-21 RX ORDER — METOPROLOL SUCCINATE 25 MG/1
12.5 TABLET, EXTENDED RELEASE ORAL DAILY
Qty: 45 TABLET | Refills: 3 | Status: SHIPPED | OUTPATIENT
Start: 2022-10-21

## 2023-02-15 DIAGNOSIS — R10.13 EPIGASTRIC ABDOMINAL PAIN: ICD-10-CM

## 2023-02-16 RX ORDER — OMEPRAZOLE 20 MG/1
20 CAPSULE, DELAYED RELEASE ORAL DAILY
Qty: 90 CAPSULE | Refills: 3 | Status: SHIPPED | OUTPATIENT
Start: 2023-02-16

## 2023-02-23 ENCOUNTER — TELEPHONE (OUTPATIENT)
Dept: PRIMARY CARE CLINIC | Age: 63
End: 2023-02-23

## 2023-02-23 RX ORDER — CIPROFLOXACIN 500 MG/1
500 TABLET, FILM COATED ORAL 2 TIMES DAILY
Qty: 20 TABLET | Refills: 0 | Status: SHIPPED | OUTPATIENT
Start: 2023-02-23 | End: 2023-03-05

## 2023-02-23 RX ORDER — METRONIDAZOLE 500 MG/1
500 TABLET ORAL 3 TIMES DAILY
Qty: 30 TABLET | Refills: 0 | Status: SHIPPED | OUTPATIENT
Start: 2023-02-23 | End: 2023-03-05

## 2023-02-23 NOTE — TELEPHONE ENCOUNTER
She is having a flare up of Diverticulosis and asking if something can be sent in for her. Please advise.      [facility]

## 2023-03-23 ENCOUNTER — OFFICE VISIT (OUTPATIENT)
Dept: PRIMARY CARE CLINIC | Age: 63
End: 2023-03-23
Payer: COMMERCIAL

## 2023-03-23 VITALS
BODY MASS INDEX: 27.28 KG/M2 | SYSTOLIC BLOOD PRESSURE: 112 MMHG | HEART RATE: 76 BPM | OXYGEN SATURATION: 100 % | DIASTOLIC BLOOD PRESSURE: 70 MMHG | WEIGHT: 174.2 LBS

## 2023-03-23 DIAGNOSIS — M54.50 CHRONIC BILATERAL LOW BACK PAIN WITHOUT SCIATICA: ICD-10-CM

## 2023-03-23 DIAGNOSIS — M54.9 CHRONIC MID BACK PAIN: ICD-10-CM

## 2023-03-23 DIAGNOSIS — G89.29 CHRONIC BILATERAL LOW BACK PAIN WITHOUT SCIATICA: ICD-10-CM

## 2023-03-23 DIAGNOSIS — M54.9 CHRONIC MID BACK PAIN: Primary | ICD-10-CM

## 2023-03-23 DIAGNOSIS — G89.29 CHRONIC MID BACK PAIN: Primary | ICD-10-CM

## 2023-03-23 DIAGNOSIS — G89.29 CHRONIC MID BACK PAIN: ICD-10-CM

## 2023-03-23 DIAGNOSIS — I49.3 FREQUENT PVCS: ICD-10-CM

## 2023-03-23 DIAGNOSIS — Z12.31 SCREENING MAMMOGRAM FOR BREAST CANCER: ICD-10-CM

## 2023-03-23 PROBLEM — R00.2 INTERMITTENT PALPITATIONS: Status: ACTIVE | Noted: 2022-11-23

## 2023-03-23 PROBLEM — I47.29 NONSUSTAINED VENTRICULAR TACHYCARDIA (HCC): Status: ACTIVE | Noted: 2022-11-23

## 2023-03-23 PROBLEM — I25.10 CORONARY ARTERY DISEASE INVOLVING NATIVE CORONARY ARTERY OF NATIVE HEART WITHOUT ANGINA PECTORIS: Status: ACTIVE | Noted: 2023-02-08

## 2023-03-23 PROCEDURE — 3017F COLORECTAL CA SCREEN DOC REV: CPT | Performed by: NURSE PRACTITIONER

## 2023-03-23 PROCEDURE — G8419 CALC BMI OUT NRM PARAM NOF/U: HCPCS | Performed by: NURSE PRACTITIONER

## 2023-03-23 PROCEDURE — 99214 OFFICE O/P EST MOD 30 MIN: CPT | Performed by: NURSE PRACTITIONER

## 2023-03-23 PROCEDURE — 1036F TOBACCO NON-USER: CPT | Performed by: NURSE PRACTITIONER

## 2023-03-23 PROCEDURE — G8427 DOCREV CUR MEDS BY ELIG CLIN: HCPCS | Performed by: NURSE PRACTITIONER

## 2023-03-23 PROCEDURE — G8484 FLU IMMUNIZE NO ADMIN: HCPCS | Performed by: NURSE PRACTITIONER

## 2023-03-23 RX ORDER — METOPROLOL SUCCINATE 25 MG/1
25 TABLET, EXTENDED RELEASE ORAL DAILY
Qty: 1 TABLET | Refills: 0
Start: 2023-03-23

## 2023-03-23 ASSESSMENT — PATIENT HEALTH QUESTIONNAIRE - PHQ9
SUM OF ALL RESPONSES TO PHQ QUESTIONS 1-9: 6
5. POOR APPETITE OR OVEREATING: 0
4. FEELING TIRED OR HAVING LITTLE ENERGY: 2
7. TROUBLE CONCENTRATING ON THINGS, SUCH AS READING THE NEWSPAPER OR WATCHING TELEVISION: 2
SUM OF ALL RESPONSES TO PHQ QUESTIONS 1-9: 6
SUM OF ALL RESPONSES TO PHQ QUESTIONS 1-9: 6
3. TROUBLE FALLING OR STAYING ASLEEP: 2
2. FEELING DOWN, DEPRESSED OR HOPELESS: 0
10. IF YOU CHECKED OFF ANY PROBLEMS, HOW DIFFICULT HAVE THESE PROBLEMS MADE IT FOR YOU TO DO YOUR WORK, TAKE CARE OF THINGS AT HOME, OR GET ALONG WITH OTHER PEOPLE: 0
SUM OF ALL RESPONSES TO PHQ QUESTIONS 1-9: 6
9. THOUGHTS THAT YOU WOULD BE BETTER OFF DEAD, OR OF HURTING YOURSELF: 0
6. FEELING BAD ABOUT YOURSELF - OR THAT YOU ARE A FAILURE OR HAVE LET YOURSELF OR YOUR FAMILY DOWN: 0
1. LITTLE INTEREST OR PLEASURE IN DOING THINGS: 0
8. MOVING OR SPEAKING SO SLOWLY THAT OTHER PEOPLE COULD HAVE NOTICED. OR THE OPPOSITE, BEING SO FIGETY OR RESTLESS THAT YOU HAVE BEEN MOVING AROUND A LOT MORE THAN USUAL: 0
SUM OF ALL RESPONSES TO PHQ9 QUESTIONS 1 & 2: 0

## 2023-03-23 ASSESSMENT — ENCOUNTER SYMPTOMS: BACK PAIN: 1

## 2023-03-23 NOTE — PATIENT INSTRUCTIONS
Complete x-ray of mid and lower back  Complete mammogram  Recommend complete colonoscopy based on Dr. Jess Lazar recommendations. If X-ray is OK recommend physical therapy.

## 2023-03-23 NOTE — PROGRESS NOTES
(PRILOSEC) 20 MG delayed release capsule Take 1 capsule by mouth Daily 90 capsule 3    imipramine (TOFRANIL) 50 MG tablet TAKE 1 TABLET BY MOUTH AT  NIGHT 90 tablet 3    amitriptyline (ELAVIL) 25 MG tablet Take 1 tablet by mouth nightly 90 tablet 3    hydrocortisone POWD powder Hydrocortisone 80mg,Tetracycline, 1.5g, Elixir of Benedryl 240ml in equal parts. 1-1 1/2 tsp gargle for 2 minutes and swallow qid  each 0    timolol (TIMOPTIC-XE) 0.5 % ophthalmic gel-forming Place 1 drop into both eyes nightly 1 each 3    vitamin D (CHOLECALCIFEROL) 50 MCG (2000 UT) TABS tablet Take 2,000 Units by mouth daily      SUMAtriptan (IMITREX) 25 MG tablet Take 1 tablet by mouth once as needed for Migraine 27 tablet 3     No current facility-administered medications for this visit. Allergies   Allergen Reactions    Citalopram     Meperidine     Morphine        Health Maintenance   Topic Date Due    Shingles vaccine (1 of 2) Never done    Breast cancer screen  08/26/2018    Colorectal Cancer Screen  01/30/2022    COVID-19 Vaccine (4 - Booster for Moderna series) 05/16/2022    Depression Monitoring  06/07/2022    Flu vaccine (1) 08/01/2022    Lipids  06/07/2026    DTaP/Tdap/Td vaccine (2 - Td or Tdap) 06/07/2031    Hepatitis C screen  Completed    HIV screen  Completed    Hepatitis A vaccine  Aged Out    Hib vaccine  Aged Out    Meningococcal (ACWY) vaccine  Aged Out    Pneumococcal 0-64 years Vaccine  Aged Out       Subjective:      Review of Systems   Musculoskeletal:  Positive for back pain and neck pain. Skin:  Negative for rash and wound. Neurological:  Positive for headaches. Negative for dizziness and light-headedness. Psychiatric/Behavioral:  Positive for sleep disturbance (3 hours). Negative for dysphoric mood. The patient is nervous/anxious. Objective:     /70   Pulse 76   Wt 174 lb 3.2 oz (79 kg)   SpO2 100%   BMI 27.28 kg/m²   Physical Exam  Vitals and nursing note reviewed.

## 2023-03-23 NOTE — PROGRESS NOTES
Chronic mid back pain:  Referral entered for physical therapy:  [facility]  [address], [place]  [address]    [phone]

## 2023-03-29 DIAGNOSIS — M47.816 FACET HYPERTROPHY OF LUMBAR REGION: Primary | ICD-10-CM

## 2023-08-15 ENCOUNTER — TELEPHONE (OUTPATIENT)
Dept: PRIMARY CARE CLINIC | Age: 63
End: 2023-08-15

## 2023-08-15 NOTE — TELEPHONE ENCOUNTER
Patient drop off [employer] Forms on 08/15/23. Patient will  the forms once is done. Gave the forms to Lorraine.

## 2023-08-23 RX ORDER — IMIPRAMINE HCL 50 MG
TABLET ORAL
Qty: 90 TABLET | Refills: 3 | Status: SHIPPED | OUTPATIENT
Start: 2023-08-23

## 2023-12-13 ENCOUNTER — OFFICE VISIT (OUTPATIENT)
Dept: PRIMARY CARE CLINIC | Age: 63
End: 2023-12-13
Payer: COMMERCIAL

## 2023-12-13 VITALS
WEIGHT: 180.4 LBS | SYSTOLIC BLOOD PRESSURE: 108 MMHG | OXYGEN SATURATION: 100 % | TEMPERATURE: 98.1 F | BODY MASS INDEX: 28.31 KG/M2 | HEIGHT: 67 IN | DIASTOLIC BLOOD PRESSURE: 60 MMHG | HEART RATE: 83 BPM

## 2023-12-13 DIAGNOSIS — J01.30 ACUTE NON-RECURRENT SPHENOIDAL SINUSITIS: Primary | ICD-10-CM

## 2023-12-13 PROCEDURE — G8427 DOCREV CUR MEDS BY ELIG CLIN: HCPCS | Performed by: STUDENT IN AN ORGANIZED HEALTH CARE EDUCATION/TRAINING PROGRAM

## 2023-12-13 PROCEDURE — 3017F COLORECTAL CA SCREEN DOC REV: CPT | Performed by: STUDENT IN AN ORGANIZED HEALTH CARE EDUCATION/TRAINING PROGRAM

## 2023-12-13 PROCEDURE — G8484 FLU IMMUNIZE NO ADMIN: HCPCS | Performed by: STUDENT IN AN ORGANIZED HEALTH CARE EDUCATION/TRAINING PROGRAM

## 2023-12-13 PROCEDURE — 99213 OFFICE O/P EST LOW 20 MIN: CPT | Performed by: STUDENT IN AN ORGANIZED HEALTH CARE EDUCATION/TRAINING PROGRAM

## 2023-12-13 PROCEDURE — 1036F TOBACCO NON-USER: CPT | Performed by: STUDENT IN AN ORGANIZED HEALTH CARE EDUCATION/TRAINING PROGRAM

## 2023-12-13 PROCEDURE — G8419 CALC BMI OUT NRM PARAM NOF/U: HCPCS | Performed by: STUDENT IN AN ORGANIZED HEALTH CARE EDUCATION/TRAINING PROGRAM

## 2023-12-13 RX ORDER — AMOXICILLIN AND CLAVULANATE POTASSIUM 875; 125 MG/1; MG/1
1 TABLET, FILM COATED ORAL 2 TIMES DAILY
Qty: 14 TABLET | Refills: 0 | Status: SHIPPED | OUTPATIENT
Start: 2023-12-13 | End: 2023-12-20

## 2023-12-13 SDOH — ECONOMIC STABILITY: INCOME INSECURITY: HOW HARD IS IT FOR YOU TO PAY FOR THE VERY BASICS LIKE FOOD, HOUSING, MEDICAL CARE, AND HEATING?: NOT HARD AT ALL

## 2023-12-13 SDOH — ECONOMIC STABILITY: FOOD INSECURITY: WITHIN THE PAST 12 MONTHS, THE FOOD YOU BOUGHT JUST DIDN'T LAST AND YOU DIDN'T HAVE MONEY TO GET MORE.: NEVER TRUE

## 2023-12-13 SDOH — ECONOMIC STABILITY: HOUSING INSECURITY
IN THE LAST 12 MONTHS, WAS THERE A TIME WHEN YOU DID NOT HAVE A STEADY PLACE TO SLEEP OR SLEPT IN A SHELTER (INCLUDING NOW)?: NO

## 2023-12-13 SDOH — ECONOMIC STABILITY: FOOD INSECURITY: WITHIN THE PAST 12 MONTHS, YOU WORRIED THAT YOUR FOOD WOULD RUN OUT BEFORE YOU GOT MONEY TO BUY MORE.: NEVER TRUE

## 2023-12-13 ASSESSMENT — ENCOUNTER SYMPTOMS
SINUS PRESSURE: 1
SORE THROAT: 0
ABDOMINAL PAIN: 0
SHORTNESS OF BREATH: 0
WHEEZING: 0
NAUSEA: 0
SINUS PAIN: 0
COUGH: 1
RHINORRHEA: 0
TROUBLE SWALLOWING: 0
VOMITING: 0
DIARRHEA: 0

## 2023-12-13 NOTE — PROGRESS NOTES
[address] PRIMARY CARE  [place]  [facility] [address]  Dept: 333 Leta Alex is a 59 y.o. female Established patient, who presents acutely for her complaints as noted below:    Chief Complaint   Patient presents with    Cough    Nasal Congestion       HPI:     5 days, cough. Feels as if it is in her chest. Denies using any Mucinex. Reviewed prior notes from PCP. Reviewed previous labs.     LDL Cholesterol (mg/dL)   Date Value   06/07/2021 163 (H)     LDL Calculated (mg/dL)   Date Value   07/15/2019 162 (H)       (goal LDL is <100)   AST (U/L)   Date Value   06/07/2021 24     ALT (U/L)   Date Value   06/07/2021 21     BUN (mg/dL)   Date Value   06/07/2021 13     TSH (mIU/L)   Date Value   06/07/2021 1.53     BP Readings from Last 3 Encounters:   12/13/23 108/60   03/23/23 112/70   03/14/22 120/72          (goal 120/80)    Past Medical History:   Diagnosis Date    Chest pain     Diverticulitis     Diverticulosis     Endometriosis     Skin cancer       Past Surgical History:   Procedure Laterality Date    HYSTERECTOMY, VAGINAL  2009       Family History   Problem Relation Age of Onset    Alzheimer's Disease Mother     Cancer Father         stomach    Heart Attack Father 61    Cancer Sister        Social History     Tobacco Use    Smoking status: Never    Smokeless tobacco: Never   Substance Use Topics    Alcohol use: Yes     Comment: ocassioanly      Current Outpatient Medications   Medication Sig Dispense Refill    amoxicillin-clavulanate (AUGMENTIN) 875-125 MG per tablet Take 1 tablet by mouth 2 times daily for 7 days 14 tablet 0    imipramine (TOFRANIL) 50 MG tablet TAKE 1 TABLET BY MOUTH AT  NIGHT 90 tablet 3    metoprolol succinate (TOPROL XL) 25 MG extended release tablet Take 1 tablet by mouth daily 1 tablet 0    omeprazole (PRILOSEC) 20 MG delayed release capsule Take 1 capsule by mouth Daily 90 capsule 3    amitriptyline (ELAVIL) 25 MG tablet

## 2024-02-23 ENCOUNTER — PATIENT MESSAGE (OUTPATIENT)
Dept: PRIMARY CARE CLINIC | Age: 64
End: 2024-02-23

## 2024-03-11 DIAGNOSIS — R92.8 ABNORMAL ULTRASOUND OF BREAST: Primary | ICD-10-CM

## 2024-03-19 ENCOUNTER — INITIAL CONSULT (OUTPATIENT)
Dept: ONCOLOGY | Age: 64
End: 2024-03-19
Payer: COMMERCIAL

## 2024-03-19 DIAGNOSIS — Z80.3 FAMILY HISTORY OF BREAST CANCER: ICD-10-CM

## 2024-03-19 DIAGNOSIS — R92.8 ABNORMAL ULTRASOUND OF BREAST: Primary | ICD-10-CM

## 2024-03-19 PROCEDURE — 96040 PR MEDICAL GENETICS COUNSELING EACH 30 MINUTES: CPT | Performed by: GENETIC COUNSELOR, MS

## 2024-03-19 NOTE — PROGRESS NOTES
[facility] Genetic Counseling Program   Hereditary Cancer Risk Assessment     Name: Ceci Marks   YOB: 1960   Date of Consultation: 3/19/24   Referred by:   Ender Joy MD  [address] [address]    Ms. Marks was seen at the [facility] for genetic counseling on 3/19/24.  Ms. Marks was referred by Ender Joy MD due to her family history of cancer and to discuss her risk factors for breast cancer.     PERSONAL HISTORY   Ms. Marks is a 63 y.o.  female with no personal history of cancer.     She reports:     Menarche at age: 13y  First child at age: NA, nulliparous   Menopause at age: 50y, due to TH/BSO   HRT: 2 years total     Patient's last mammogram was in 2016. She relates that she has not completed them regularly due to having saline implants and fear of them rupturing. She relates she was tired of getting regular reminders from [facility] about completing her mammogram so she sought out alternative options. She attended a screening even in [place] through \"[facility]\" which offered breast ultrasound screening out of pocket. This was done on 2/13/24. She states she received those results through her email. She is anxious because they read abnormal. She subsequently requested those results be released to her PCP who referred her to the High Risk Breast Program.      She has never had a breast MRI or breast biopsy. She states her saline implants are over 30 years old and she does not recall the surgeon who placed them. They are currently causing her pain which is also adding to her concern. If her genetic testing comes back positive, she is leaning towards risk reducing mastectomy.     Her Sari Gao risk was calculated in the office as 14%. Although, her breast density is unknown and may influence that number once available.     FAMILY HISTORY  Ms. Marks has no biological children.     She has one full

## 2024-03-20 ENCOUNTER — OFFICE VISIT (OUTPATIENT)
Dept: SURGERY | Age: 64
End: 2024-03-20

## 2024-03-20 VITALS
HEIGHT: 67 IN | WEIGHT: 185 LBS | BODY MASS INDEX: 29.03 KG/M2 | HEART RATE: 81 BPM | TEMPERATURE: 97.9 F | SYSTOLIC BLOOD PRESSURE: 125 MMHG | DIASTOLIC BLOOD PRESSURE: 82 MMHG

## 2024-03-20 DIAGNOSIS — Z80.3 FAMILY HISTORY OF BREAST CANCER: Primary | ICD-10-CM

## 2024-03-20 DIAGNOSIS — T85.43XA BREAST IMPLANT RUPTURE, INITIAL ENCOUNTER: ICD-10-CM

## 2024-03-20 RX ORDER — ALPRAZOLAM 0.5 MG/1
0.5 TABLET ORAL 3 TIMES DAILY PRN
Qty: 3 TABLET | Refills: 0 | Status: SHIPPED | OUTPATIENT
Start: 2024-03-20 | End: 2024-04-19

## 2024-03-20 RX ORDER — FLUOROURACIL 50 MG/G
CREAM TOPICAL
COMMUNITY
Start: 2024-03-08

## 2024-03-20 NOTE — PROGRESS NOTES
Patient's Name/Date of Birth: Ceci Marks / 1960 (63 y.o.)    Date: 2024     HPI: Pt is a 63 y.o. female who presents to [facility] for evaluation of her bilateral breasts after having an abnormal [facility] ultrasound 2024. It had the following findings:    In the right breast, an elongated hypoechoic area at 2:00 that measured about 2.7 cm.  Questioned periprosthetic fluid.  Encapsulation right breast prosthesis.  6 mm hypoechoic area at 5:00 of the right breast.  There was also noted a 9 mm complex solid cystic lesion at 1:00 in the left breast.  There is a complex hypoechoic area at 2:00 of the left breast measuring about 11 mm.  Question periprosthetic fluid with periprosthetic fluid also suspected at the 8:00 left breast.      She is currently experiencing pain from the implants.  She had saline implants placed approximately 30 years ago.  She is does admit that she feels like the implants are getting smaller.  She denies any nipple discharge or any fevers or chills.    Her GYN history significant for menarche at age 13.  She is nulliparous.  She had a LISSETT/BSO at 50 resulting in menopause and was on HRT for 2-3 years.  She denies any hormone replacement.    Family history is below:  She has one full brother and one paternal half sister. The sister developed ovarian cancer at age 35 and  at age 36 from the disease. Her sister's daughter (Ms. Marks's half niece)  at age 50 from leukemia.    She relates that her father had stomach cancer at age 81 and  from complications of surgery.    Her father's sister (Ms. Marks's paternal aunt) had breast cancer her 50s. One of her brother's daughters (Ms. Marks's paternal first cousin) also had breast cancer in her late 50s.  Another paternal first cousin had an unknown cancer at age 34. A paternal uncle had lung cancer, although he worked in a coal mine.    There are no known cancers in her maternal side.

## 2024-03-20 NOTE — PATIENT INSTRUCTIONS
You likely have breast implant rupture based on your breast ultrasound  We will get an MRI to evaluate. Insurance may require an mammogram before the MRI can be completed.  Return to clinic after the MRI to go over results and plan next steps.

## 2024-04-24 ENCOUNTER — TELEPHONE (OUTPATIENT)
Dept: ONCOLOGY | Age: 64
End: 2024-04-24

## 2024-04-24 NOTE — TELEPHONE ENCOUNTER
[facility] Genetic Counseling Program   Hereditary Cancer Risk Assessment     Name: Ceci Marks  YOB: 1960  Date of Results Disclosure: 4/24/24     HISTORY   Ms. Marks was seen for genetic counseling at the request of Ender Joy MD due to her family history of cancer and recent abnormal breast ultrasound.  At that time, Ms. Marks chose to pursue genetic testing via the CancerNext Expanded + RNA hereditary cancer gene panel. These results were discussed with Ms. Marks via telephone. A summary of Ms. Marks's results and recommendations are below.     RESULTS  [facility] CancerNext-Expanded Panel + RNAinsight: NEGATIVE - NO CLINICALLY SIGNIFICANT MUTATIONS DETECTED   This panel included the analysis of 71 genes associated with hereditary cancer including: AIP, ALK, APC, SILVIO, BAP1, BARD1, BMPR1A, BRCA1, BRCA2, BRIP1, CDC73, CDH1, CDK4, CDKN1B, CDKN2A, CHEK2, CTNNA1, DICER1, EGFR, EGLN1, EPCAM, FH, FLCN, GREM1, HOXB13, KIF1B, KIT1, LZTR1, MAX, MEN1, MET, MITF, MLH1, MSH2, MSH3, MSH6, MUTYH, NF1, NF2, NTHL1, PALB2, PDGFRA, PHOX2B, PMS2, POLD1, POLE, POT1, UBSTC8G, PTCH1, PTEN, RAD51C, RAD51D, RB1, RET, SDHA, SDHAF2, SDHB, SDHC, ,SDHD, SMAD4, SMARCA4, SMARCB1, SMARCE1, STK11, SUFU, ZUHF947, TP53, TSC1, TSC2, VHL. In addition, no clinically relevant aberrant RNA transcripts were detected in select genes. Please refer to genetic test report for technical details.    We discussed that Ms. Marks's negative test result greatly reduces the likelihood that she carries a hereditary gene mutation. However, it is possible that her family history of cancer is due to a hereditary mutation which she did not inherit.  It is also possible that her family history of cancer may be due to a gene for which testing was not performed or which has yet to be discovered.     RECOMMENDATIONS  1) Ms. Marks should continue cancer screening guidelines as directed by her physicians.     RECOMMENDATIONS FOR FAMILY

## 2024-07-10 DIAGNOSIS — R10.13 EPIGASTRIC ABDOMINAL PAIN: ICD-10-CM

## 2024-07-10 RX ORDER — IMIPRAMINE HCL 50 MG
TABLET ORAL
Qty: 90 TABLET | Refills: 3 | OUTPATIENT
Start: 2024-07-10

## 2024-07-11 ENCOUNTER — TELEPHONE (OUTPATIENT)
Dept: PRIMARY CARE CLINIC | Age: 64
End: 2024-07-11

## 2024-07-11 NOTE — TELEPHONE ENCOUNTER
Called patient twice to schedule.  LM to schedule via my chart or call the office.  ----- Message from Malaika Perry Graves sent at 7/10/2024  3:53 PM EDT -----  Regarding: [facility] Appointment Request  E[facility] Appointment Request    Patient needs appointment for ECC Appointment Type: Existing Condition Follow Up.    Patient Requested Dates(s):  Patient Requested Time:  Provider Name: Joy Ender Karlos,      Reason for Appointment Request: Established Patient - Available appointments did not meet patient need/ Medication refill and there insurance is expiring on the end of July and wants an appointment before it  --------------------------------------------------------------------------------------------------------------------------    Relationship to Patient: Self     Call Back Information: OK to leave message on voicemail  Preferred Call Back Number: Phone [phone]

## 2024-07-18 DIAGNOSIS — R10.13 EPIGASTRIC ABDOMINAL PAIN: ICD-10-CM

## 2024-07-18 RX ORDER — OMEPRAZOLE 20 MG/1
20 CAPSULE, DELAYED RELEASE ORAL DAILY
Qty: 90 CAPSULE | Refills: 3 | Status: SHIPPED | OUTPATIENT
Start: 2024-07-18

## 2024-07-18 RX ORDER — IMIPRAMINE HCL 50 MG
50 TABLET ORAL NIGHTLY
Qty: 90 TABLET | Refills: 3 | Status: SHIPPED | OUTPATIENT
Start: 2024-07-18

## 2024-07-18 NOTE — TELEPHONE ENCOUNTER
LAST VISIT:   12/13/2023     Future Appointments   Date Time Provider Department Center   7/29/2024 11:30 AM [facility]

## 2024-07-29 ENCOUNTER — OFFICE VISIT (OUTPATIENT)
Dept: PRIMARY CARE CLINIC | Age: 64
End: 2024-07-29
Payer: COMMERCIAL

## 2024-07-29 VITALS
WEIGHT: 176.6 LBS | DIASTOLIC BLOOD PRESSURE: 72 MMHG | BODY MASS INDEX: 26.16 KG/M2 | OXYGEN SATURATION: 99 % | HEIGHT: 69 IN | SYSTOLIC BLOOD PRESSURE: 136 MMHG | HEART RATE: 75 BPM

## 2024-07-29 DIAGNOSIS — Z13.220 ENCOUNTER FOR LIPID SCREENING FOR CARDIOVASCULAR DISEASE: ICD-10-CM

## 2024-07-29 DIAGNOSIS — Z13.6 ENCOUNTER FOR LIPID SCREENING FOR CARDIOVASCULAR DISEASE: ICD-10-CM

## 2024-07-29 DIAGNOSIS — Z12.11 ENCOUNTER FOR SCREENING FOR MALIGNANT NEOPLASM OF COLON: ICD-10-CM

## 2024-07-29 DIAGNOSIS — Z00.00 ANNUAL PHYSICAL EXAM: ICD-10-CM

## 2024-07-29 DIAGNOSIS — M50.30 DEGENERATIVE DISC DISEASE, CERVICAL: ICD-10-CM

## 2024-07-29 DIAGNOSIS — G43.709 CHRONIC MIGRAINE WITHOUT AURA WITHOUT STATUS MIGRAINOSUS, NOT INTRACTABLE: Primary | ICD-10-CM

## 2024-07-29 PROBLEM — B02.9 HERPES ZOSTER WITHOUT COMPLICATION: Status: RESOLVED | Noted: 2020-03-09 | Resolved: 2024-07-29

## 2024-07-29 PROBLEM — L81.9 DISCOLORATION OF SKIN OF FOOT: Status: RESOLVED | Noted: 2018-06-05 | Resolved: 2024-07-29

## 2024-07-29 PROCEDURE — 3017F COLORECTAL CA SCREEN DOC REV: CPT | Performed by: FAMILY MEDICINE

## 2024-07-29 PROCEDURE — G8427 DOCREV CUR MEDS BY ELIG CLIN: HCPCS | Performed by: FAMILY MEDICINE

## 2024-07-29 PROCEDURE — 99214 OFFICE O/P EST MOD 30 MIN: CPT | Performed by: FAMILY MEDICINE

## 2024-07-29 PROCEDURE — G8419 CALC BMI OUT NRM PARAM NOF/U: HCPCS | Performed by: FAMILY MEDICINE

## 2024-07-29 PROCEDURE — 1036F TOBACCO NON-USER: CPT | Performed by: FAMILY MEDICINE

## 2024-07-29 RX ORDER — IMIPRAMINE HCL 50 MG
50 TABLET ORAL NIGHTLY
Qty: 90 TABLET | Refills: 3 | Status: SHIPPED | OUTPATIENT
Start: 2024-07-29

## 2024-07-29 RX ORDER — AMITRIPTYLINE HYDROCHLORIDE 25 MG/1
25 TABLET, FILM COATED ORAL NIGHTLY
Qty: 90 TABLET | Refills: 3 | Status: SHIPPED | OUTPATIENT
Start: 2024-07-29

## 2024-07-29 ASSESSMENT — ENCOUNTER SYMPTOMS
ABDOMINAL PAIN: 0
EYE DISCHARGE: 0
SORE THROAT: 0
VOMITING: 0
DIARRHEA: 0
SHORTNESS OF BREATH: 0
WHEEZING: 0
COUGH: 0
RHINORRHEA: 0
NAUSEA: 0
EYE REDNESS: 0

## 2024-07-29 ASSESSMENT — PATIENT HEALTH QUESTIONNAIRE - PHQ9
9. THOUGHTS THAT YOU WOULD BE BETTER OFF DEAD, OR OF HURTING YOURSELF: NOT AT ALL
SUM OF ALL RESPONSES TO PHQ QUESTIONS 1-9: 0
5. POOR APPETITE OR OVEREATING: NOT AT ALL
8. MOVING OR SPEAKING SO SLOWLY THAT OTHER PEOPLE COULD HAVE NOTICED. OR THE OPPOSITE, BEING SO FIGETY OR RESTLESS THAT YOU HAVE BEEN MOVING AROUND A LOT MORE THAN USUAL: NOT AT ALL
4. FEELING TIRED OR HAVING LITTLE ENERGY: NOT AT ALL
7. TROUBLE CONCENTRATING ON THINGS, SUCH AS READING THE NEWSPAPER OR WATCHING TELEVISION: NOT AT ALL
6. FEELING BAD ABOUT YOURSELF - OR THAT YOU ARE A FAILURE OR HAVE LET YOURSELF OR YOUR FAMILY DOWN: NOT AT ALL
3. TROUBLE FALLING OR STAYING ASLEEP: NOT AT ALL
SUM OF ALL RESPONSES TO PHQ QUESTIONS 1-9: 0
SUM OF ALL RESPONSES TO PHQ QUESTIONS 1-9: 0
1. LITTLE INTEREST OR PLEASURE IN DOING THINGS: NOT AT ALL
2. FEELING DOWN, DEPRESSED OR HOPELESS: NOT AT ALL
SUM OF ALL RESPONSES TO PHQ9 QUESTIONS 1 & 2: 0
SUM OF ALL RESPONSES TO PHQ QUESTIONS 1-9: 0

## 2024-07-29 NOTE — PROGRESS NOTES
[facility]  [facility] PRIMARY [facility]ARE  [address]  [place] [address]  Dept: [phone]    Ceci Marks is a 64 y.o. female Established patient, who presents today for her medical conditions/complaints as noted below.      Chief Complaint   Patient presents with    Migraine       HPI:     HPI  Patient presents for a follow up for migraines. She is currently taking imipramine. Last ocular migraine was . She has had neck pain that is causing headaches approx 2x week and is starting PT for it this week. No chest pain or SOB, nausea, vomiting.     Reviewed prior notes None  Reviewed previous Labs    No components found for: \"LDLCHOLESTEROL\", \"LDLCALC\"    (goal LDL is <100)   AST (U/L)   Date Value   2021 24     ALT (U/L)   Date Value   2021 21     BUN (mg/dL)   Date Value   2021 13     TSH (mIU/L)   Date Value   2021 1.53     BP Readings from Last 3 Encounters:   24 136/72   24 125/82   23 108/60          (goal 120/80)    Past Medical History:   Diagnosis Date    Chest pain     Diverticulitis     Diverticulosis     Endometriosis     Skin cancer       Past Surgical History:   Procedure Laterality Date    HYSTERECTOMY, VAGINAL  2009       Family History   Problem Relation Age of Onset    Alzheimer's Disease Mother     Cancer Father         stomach    Heart Attack Father 60    Cancer Sister     Breast Cancer Paternal Aunt     Cancer Paternal Uncle     Breast Cancer Paternal Cousin     Cancer Niece     Cancer Niece        Social History     Tobacco Use    Smoking status: Former     Current packs/day: 0.00     Average packs/day: 0.3 packs/day for 6.0 years (1.5 ttl pk-yrs)     Types: Cigarettes     Start date:      Quit date:      Years since quittin.6    Smokeless tobacco: Never    Tobacco comments:     Smoked socially when she was 16 y/o   Substance Use Topics    Alcohol use: Yes     Comment: [email or web address]      Current

## 2024-07-31 ENCOUNTER — TELEPHONE (OUTPATIENT)
Dept: PRIMARY CARE CLINIC | Age: 64
End: 2024-07-31

## 2024-07-31 DIAGNOSIS — M50.30 DEGENERATIVE DISC DISEASE, CERVICAL: ICD-10-CM

## 2024-07-31 DIAGNOSIS — G89.29 CHRONIC BILATERAL LOW BACK PAIN WITHOUT SCIATICA: ICD-10-CM

## 2024-07-31 DIAGNOSIS — G43.709 CHRONIC MIGRAINE WITHOUT AURA WITHOUT STATUS MIGRAINOSUS, NOT INTRACTABLE: Primary | ICD-10-CM

## 2024-07-31 DIAGNOSIS — M54.50 CHRONIC BILATERAL LOW BACK PAIN WITHOUT SCIATICA: ICD-10-CM

## 2024-07-31 NOTE — TELEPHONE ENCOUNTER
Patient asking for a referral for physical therapy for her headaches and upper back pain.    [facility]   [phone] Fax

## 2024-08-22 ENCOUNTER — PATIENT MESSAGE (OUTPATIENT)
Dept: PRIMARY CARE CLINIC | Age: 64
End: 2024-08-22

## 2024-08-22 RX ORDER — IMIPRAMINE HCL 50 MG
50 TABLET ORAL NIGHTLY
Qty: 90 TABLET | Refills: 3 | Status: SHIPPED | OUTPATIENT
Start: 2024-08-22

## 2024-08-26 RX ORDER — IMIPRAMINE HCL 50 MG
50 TABLET ORAL NIGHTLY
Qty: 90 TABLET | Refills: 3 | Status: SHIPPED | OUTPATIENT
Start: 2024-08-26

## 2024-12-05 ENCOUNTER — TELEPHONE (OUTPATIENT)
Dept: PRIMARY CARE CLINIC | Age: 64
End: 2024-12-05

## 2024-12-05 NOTE — TELEPHONE ENCOUNTER
Patient is requesting an appointment today due to her cat scratching her hand this morning.    Patient states her cat had bitten her in the past and the injury usually gets infected. Patient states this injury happened about an hour ago to the top of her hand. Patient states she was able to get the bleeding to stop however now the area is quite swollen, red and purple in color. Please advise.

## 2024-12-05 NOTE — TELEPHONE ENCOUNTER
Spoke with patient, she said she will call tomorrow if she thinks she needs appointment, She said redness and swelling went away.

## 2024-12-05 NOTE — TELEPHONE ENCOUNTER
Called patient but she did not answer.     Dr. Joy was only in the office for a half day today and he is at [facility] tomorrow.

## 2024-12-06 ENCOUNTER — OFFICE VISIT (OUTPATIENT)
Dept: PRIMARY CARE CLINIC | Age: 64
End: 2024-12-06

## 2024-12-06 VITALS
SYSTOLIC BLOOD PRESSURE: 134 MMHG | BODY MASS INDEX: 27.31 KG/M2 | WEIGHT: 174 LBS | HEIGHT: 67 IN | OXYGEN SATURATION: 91 % | HEART RATE: 82 BPM | DIASTOLIC BLOOD PRESSURE: 74 MMHG
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
## 2024-12-06 DIAGNOSIS — N89.8 VAGINAL MASS: ICD-10-CM

## 2024-12-06 DIAGNOSIS — Z12.11 ENCOUNTER FOR SCREENING FOR MALIGNANT NEOPLASM OF COLON: ICD-10-CM

## 2024-12-06 RX ORDER — SCOLOPAMINE TRANSDERMAL SYSTEM 1 MG/1
1 PATCH, EXTENDED RELEASE TRANSDERMAL
COMMUNITY

## 2024-12-06 RX ORDER — CLOBETASOL PROPIONATE 0.5 MG/G
CREAM TOPICAL 2 TIMES DAILY
COMMUNITY
Start: 2024-09-16

## 2024-12-06 ASSESSMENT — ENCOUNTER SYMPTOMS
EYE DISCHARGE: 0
SORE THROAT: 0
VOMITING: 0
DIARRHEA: 0
NAUSEA: 0
COUGH: 0
WHEEZING: 0
SHORTNESS OF BREATH: 0
RHINORRHEA: 0
ABDOMINAL PAIN: 0
EYE REDNESS: 0

## 2024-12-06 NOTE — PROGRESS NOTES
[facility]  [facility] PRIMARY CARE  [address] [address]  Dept: [phone]    Ceci Marks is a 64 y.o. female Established patient, who presents today for her medical conditions/complaints as noted below.      Chief Complaint   Patient presents with    Animal Bite     Yesterday Morning-10am  Cat    Groin Swelling     Patient states she is swollen and \"something is protruding\"         HPI:     HPI  Patient states was scratched on the dorsum of her hand yesterday by her cat.  Originally was very wet and swollen.  Is now somewhat improved.  States might be having some streaks going up the hand.  Very minimal to no pain.    Patient states recently had breast augmentation surgery done.  States that we will be getting a mammogram done after she is healed up.    Patient has history of colonic polyps.  Has been 5 years since her last colonoscopy    Patient states while showering noticed a hard lump in the vaginal region.  Denies anything protruding.  No bleeding.  Is status post hysterectomy.    Reviewed prior notes None  Reviewed previous Labs    No components found for: \"LDLCHOLESTEROL\", \"LDLCALC\"    (goal LDL is <100)   AST (U/L)   Date Value   06/07/2021 24     ALT (U/L)   Date Value   06/07/2021 21     BUN (mg/dL)   Date Value   06/07/2021 13     TSH (mIU/L)   Date Value   06/07/2021 1.53     BP Readings from Last 3 Encounters:   12/06/24 134/74   07/29/24 136/72   03/20/24 125/82          (goal 120/80)    Past Medical History:   Diagnosis Date    Chest pain     Diverticulitis     Diverticulosis     Endometriosis     Skin cancer       Past Surgical History:   Procedure Laterality Date    HYSTERECTOMY, VAGINAL  2009       Family History   Problem Relation Age of Onset    Alzheimer's Disease Mother     Cancer Father         stomach    Heart Attack Father 60    Cancer Sister     Breast Cancer Paternal Aunt     Cancer Paternal Uncle     Breast Cancer Paternal Cousin

## 2024-12-13 ENCOUNTER — TELEPHONE (OUTPATIENT)
Dept: PRIMARY CARE CLINIC | Age: 64
End: 2024-12-13

## 2024-12-13 DIAGNOSIS — R82.90 CLOUDY URINE: Primary | ICD-10-CM

## 2024-12-13 RX ORDER — CEPHALEXIN 500 MG/1
500 CAPSULE ORAL 4 TIMES DAILY
Qty: 20 CAPSULE | Refills: 0 | Status: SHIPPED | OUTPATIENT
Start: 2024-12-13 | End: 2024-12-18

## 2024-12-13 NOTE — TELEPHONE ENCOUNTER
Patient having cloudy urine, strong smell, discomfort x3 days. Asking for orders to have urine checked and medication. Pharm [facility] [place]

## 2024-12-13 NOTE — TELEPHONE ENCOUNTER
Urine culture ordered  Be sure to collect sample before starting antibiotic.  Antibiotic sent to pharmacy

## 2024-12-17 ENCOUNTER — TELEPHONE (OUTPATIENT)
Dept: PRIMARY CARE CLINIC | Age: 64
End: 2024-12-17

## 2024-12-17 NOTE — TELEPHONE ENCOUNTER
Patient states she needs an insurance referral for Dr. Ortega. Patient has an appt 12/19/24. States number to call is [phone]

## 2024-12-19 ENCOUNTER — TELEPHONE (OUTPATIENT)
Dept: PRIMARY CARE CLINIC | Age: 64
End: 2024-12-19

## 2024-12-19 ENCOUNTER — OFFICE VISIT (OUTPATIENT)
Age: 64
End: 2024-12-19
Payer: COMMERCIAL

## 2024-12-19 VITALS
DIASTOLIC BLOOD PRESSURE: 69 MMHG | TEMPERATURE: 98.6 F | SYSTOLIC BLOOD PRESSURE: 117 MMHG | WEIGHT: 179 LBS | HEIGHT: 67 IN | BODY MASS INDEX: 28.09 KG/M2 | OXYGEN SATURATION: 99 % | HEART RATE: 78 BPM

## 2024-12-19 DIAGNOSIS — Z71.89 ENCOUNTER FOR SURGICAL COUNSELING: ICD-10-CM

## 2024-12-19 DIAGNOSIS — N95.2 VAGINAL ATROPHY: ICD-10-CM

## 2024-12-19 DIAGNOSIS — N89.8 VAGINAL MASS: Primary | ICD-10-CM

## 2024-12-19 DIAGNOSIS — N81.9 VAGINAL VAULT PROLAPSE: ICD-10-CM

## 2024-12-19 DIAGNOSIS — N31.8 FREQUENCY-URGENCY SYNDROME: ICD-10-CM

## 2024-12-19 DIAGNOSIS — R33.9 RETENTION OF URINE: ICD-10-CM

## 2024-12-19 DIAGNOSIS — N81.6 RECTOCELE, FEMALE: ICD-10-CM

## 2024-12-19 DIAGNOSIS — N81.10 FEMALE CYSTOCELE: ICD-10-CM

## 2024-12-19 LAB
BILIRUBIN, POC: ABNORMAL
BLOOD URINE, POC: ABNORMAL
CLARITY, POC: CLEAR
COLOR, POC: YELLOW
GLUCOSE URINE, POC: ABNORMAL MG/DL
KETONES, POC: ABNORMAL MG/DL
LEUKOCYTE EST, POC: ABNORMAL
NITRITE, POC: ABNORMAL
PH, POC: 5
PROTEIN, POC: ABNORMAL MG/DL
SPECIFIC GRAVITY, POC: 1.02
UROBILINOGEN, POC: ABNORMAL MG/DL

## 2024-12-19 PROCEDURE — 99204 OFFICE O/P NEW MOD 45 MIN: CPT | Performed by: OBSTETRICS & GYNECOLOGY

## 2024-12-19 PROCEDURE — 81003 URINALYSIS AUTO W/O SCOPE: CPT | Performed by: OBSTETRICS & GYNECOLOGY

## 2024-12-19 PROCEDURE — 51798 US URINE CAPACITY MEASURE: CPT | Performed by: OBSTETRICS & GYNECOLOGY

## 2024-12-19 PROCEDURE — 99459 PELVIC EXAMINATION: CPT | Performed by: OBSTETRICS & GYNECOLOGY

## 2024-12-19 NOTE — PATIENT INSTRUCTIONS
cannot be guaranteed, as surgery is both patient- and procedure-specific. While Colpopexy by any of the above techniques is considered safe and effective, one specific surgical route may not be appropriate for each individual. Always ask about all treatment options, as well as their risks and benefits.    Once the pelvic organ prolapse is repaired, the urethra may no longer be occluded.  This may result in the “unmasking” of the hidden urinary incontinence problem that so often coexists with prolapse.  Urinary incontinence surgery may be needed to help reduce prolapse related urinary leakage.                Bulking agent            Sling                Retropubic Urethropexy               A transurethral bulking agent injects material into the urethra and helps to close the urethra during activities that cause urine leakage.  An incontinence sling is placed vaginally under the urethra to recreate a hammock of support that kinks the urethra closed during activities that cause urine leakage.  Retropubic urethropexy (Watson Procedure) uses an abdominal or robotic incision to resuspend the bladder neck to ligaments adjacent to the back of the pubic bone.     If prolapse or incontinence is severe or recurrent, biologic or synthetic material may need to be used for the repair.  If necessary, this will be discussed before surgery.    Most patients will be outpatient and go home the same day. For larger procedures, some patients may stay overnight.  The length of stay depends on the nature of your surgery.  You will have an intravenous line to provide fluids.  Medication to relieve pain and nausea will be available throughout your hospital stay.      It may be hard for you to urinate for a few days or weeks.  Up to 50% of women cannot urinate efficiently after surgery due to temporary bladder atony, swelling, or anesthesia. This may last a few hours to a few weeks.  Prolonged urinary retention may occur after incontinence

## 2024-12-19 NOTE — TELEPHONE ENCOUNTER
Patient came in today asking that a referral be put in place for Urogynecology.for the appointment she had this morning. Patient saw Dr. Ortega.     Please advise.

## 2024-12-19 NOTE — PROGRESS NOTES
How long have you noticed the prolapse: 2.5 week(s)    Interested in All options    Urinary Incontinence: no  Do you wear pads: no  Pad Size:  na  How often do you have to change the pad: na      Do you have trouble voiding, expelling, or having a bowel movement: yes  Do you reduce prolapse: no  Do you splint: no  Do you struggle w/ constipation: yes IBS  Do you do heavy lifting: no  Are you sexually active: yes  If not currently sexually active, are you interested in becoming sexually active: yes    Any H/O of UTI's: yes just recently   If yes, were there cultures done: no   
Recommending Robotic sacrocolpopexy to preserve length for intercourse and to be more durable for her hobbies. Will determine need for an incontinence procedure.     2.   Educational handouts were discussed & given when applicable for surgery and Kegel's.     3.   Testing recommendations were given as indicated for UDS & cystoscopy.     4.   Detailed questionnaires regarding the patient's specific condition were given to the patient when indicated. The patient understands that these are her responsibility to complete and return on a voluntary basis. Reminders to complete the questionnaires will not be given. The patient understands that failure to return the questionnaires may not give the provider a full picture of the patient's urogynecologic issues and make treatment less than optimal despite the practitioner's best effort to obtain information. Void diary and bladder questionnaires.    Multiple records reviewed. All questions were addressed to the patient's satisfaction.      Additional counseling time (minutes) spent regarding today's visit:  30 Performing: ACVISITTIMEPERFORM: Pre-visit chart review and note construction, Extensive counseling, Covering additional health topics on top of those listed in the chief complaint, and Additional testing and or procedures      Point of care: The supervising physician was present & available for assistance during the critical portions of the visit & procedure    Follow up: Return for Cystoscopy & UDS.     Electronically signed by Dallas Ortega DO on 12/19/2024 at 8:40 AM    EMR / Voice Dictation Disclaimer: - This note is created with the assistance of a speech recognition program.  While intending to generate a timely document that accurately reflects the content of the visit, there is no guarantee every grammatical, syntax, or spelling error has been or will be identified or corrected.  Additionally, system limitations of the EMR and voice recognition software beyond

## 2024-12-20 DIAGNOSIS — R33.9 UNABLE TO EMPTY BLADDER: ICD-10-CM

## 2024-12-20 DIAGNOSIS — R35.0 FREQUENT URINATION: Primary | ICD-10-CM

## 2024-12-20 PROBLEM — R55 PRE-SYNCOPE: Status: ACTIVE | Noted: 2022-11-08

## 2024-12-20 PROBLEM — R94.31 ABNORMAL ELECTROCARDIOGRAPHY: Status: ACTIVE | Noted: 2022-11-08

## 2024-12-20 PROBLEM — M75.02 ADHESIVE CAPSULITIS OF LEFT SHOULDER: Status: ACTIVE | Noted: 2020-10-15

## 2024-12-20 PROBLEM — R06.09 DYSPNEA ON EXERTION: Status: ACTIVE | Noted: 2022-11-08

## 2024-12-20 PROBLEM — Z86.69 HISTORY OF MIGRAINE: Status: ACTIVE | Noted: 2022-11-08

## 2024-12-20 NOTE — PROGRESS NOTES
Abstracted notes completed: 12/20/2024  Pts appt 1.16.25@1500    Reason for appt/abstracting: cysto    How many days do your periods last? N/a  How many pads/tampons do you typically use in 24hrs? N/a pads, n/a tampons  Are your menstrual cycles regular (every 21-35 days)? n/a  Do you have bleeding or spotting between periods? n/a  Do you have pain w/ your periods? n/a  Do you have pain in your lower abdomen or pelvis other than painful periods? n/a  If you are menopausal, have you experienced any further bleeding (postmenopausal bleeding/ PMB)? n/a, n/a    ________________________________________________________________________________________________________________    Do you have pain w/ intercourse? yes  Do you have bleeding during or after intercourse? no  Are you satisfied w/ your current sexual health? n/a  Are you using birthcontrol? n/a  If yes what method? surgical sterilization female  Have you ever had a sexually transmitted infection? no  If yes please explain: n/a

## 2025-01-08 ENCOUNTER — PROCEDURE VISIT (OUTPATIENT)
Age: 65
End: 2025-01-08

## 2025-01-08 DIAGNOSIS — N81.6 RECTOCELE, FEMALE: ICD-10-CM

## 2025-01-08 DIAGNOSIS — N89.8 VAGINAL MASS: ICD-10-CM

## 2025-01-08 DIAGNOSIS — N81.10 FEMALE CYSTOCELE: ICD-10-CM

## 2025-01-08 DIAGNOSIS — N95.2 VAGINAL ATROPHY: ICD-10-CM

## 2025-01-08 DIAGNOSIS — N31.8 FREQUENCY-URGENCY SYNDROME: Primary | ICD-10-CM

## 2025-01-08 LAB
BILIRUBIN, POC: ABNORMAL
BLOOD URINE, POC: ABNORMAL
CLARITY, POC: CLEAR
COLOR, POC: CLEAR
GLUCOSE URINE, POC: ABNORMAL MG/DL
KETONES, POC: ABNORMAL MG/DL
LEUKOCYTE EST, POC: 25
NITRITE, POC: ABNORMAL
PH, POC: 6
PROTEIN, POC: ABNORMAL MG/DL
SPECIFIC GRAVITY, POC: 1.02
UROBILINOGEN, POC: ABNORMAL MG/DL

## 2025-01-08 RX ORDER — ASCORBIC ACID 500 MG
500 TABLET ORAL DAILY
COMMUNITY

## 2025-01-08 RX ORDER — MAGNESIUM GLUCONATE 27 MG(500)
500 TABLET ORAL 2 TIMES DAILY
COMMUNITY

## 2025-01-09 NOTE — PROGRESS NOTES
capacity 300 mL with no leakage.  Adequate urethral length.  Unobstructed urethral pressure profile with pressure transmission greater than 100%.  Patient does void with a The patient voids with a Valsalva augmented effort and no dyssynergia.    The patient tolerated the procedure well. An instructional sheet was given to the patient. She was instructed to hydrate with 6 - 8 oz. glasses of water today and to maintain good hygiene to prevent a post procedural UTI. The patient knows to defer anything vaginally x 1-2 days depending on symptoms of irritation. If prescribed a post procedural antiseptic / antibiotic, the patient knows to complete it. The patient may expect some pink discharge or microhematuria for the first week after cystourethroscopy, especially if on aspirin or a blood thinner. Urethral stinging or burning should resolve within 48 hours. The patient knows to call if she has gross bleeding, dysuria, or symptoms of a UTI.  The patient was given a prophylactic antibiotic when appropriate.    The supervising physician was interpreted a technical component and reading to complete the critical and key portions of the entire surgery/procedure/ OR management and  was immediately available to assist during resident training as appropriate for the case.           VISIT RESULTS:  Results for orders placed or performed in visit on 01/08/25   POCT Urinalysis No Micro (Auto)   Result Value Ref Range    Color, UA clear     Clarity, UA clear     Glucose, UA POC norm mg/dL    Bilirubin, UA neg     Ketones, UA neg mg/dL    Spec Grav, UA 1.020     Blood, UA POC neg     pH, UA 6     Protein, UA POC neg mg/dL    Urobilinogen, UA norm mg/dL    Leukocytes, UA 25     Nitrite, UA neg     Narrative    Patient was catheterized with a 14 FR catheter, strict sterile technique maintained, 20 cc clear, non-odorous urine obtained, patient tolerated procedure well. Patient was recently treated for a UTI but states she feels much

## 2025-01-16 ENCOUNTER — PROCEDURE VISIT (OUTPATIENT)
Age: 65
End: 2025-01-16

## 2025-01-16 VITALS — HEART RATE: 78 BPM | OXYGEN SATURATION: 98 % | SYSTOLIC BLOOD PRESSURE: 144 MMHG | DIASTOLIC BLOOD PRESSURE: 78 MMHG

## 2025-01-16 DIAGNOSIS — N95.2 VAGINAL ATROPHY: ICD-10-CM

## 2025-01-16 DIAGNOSIS — Z71.89 ENCOUNTER FOR SURGICAL COUNSELING: ICD-10-CM

## 2025-01-16 DIAGNOSIS — R33.9 RETENTION OF URINE: ICD-10-CM

## 2025-01-16 DIAGNOSIS — N81.6 RECTOCELE, FEMALE: ICD-10-CM

## 2025-01-16 DIAGNOSIS — N81.9 VAGINAL VAULT PROLAPSE: ICD-10-CM

## 2025-01-16 DIAGNOSIS — N81.10 FEMALE CYSTOCELE: ICD-10-CM

## 2025-01-16 DIAGNOSIS — N31.8 FREQUENCY-URGENCY SYNDROME: Primary | ICD-10-CM

## 2025-01-16 NOTE — PROGRESS NOTES
information.     Multiple records reviewed. All questions were addressed to the patient's satisfaction.    Additional procedural time (minutes) spent regarding today's visit:  20 Performing: ACVISITTIMEPERFORM: Pre-visit chart review and note construction  Additional counseling time (minutes) spent regarding today's visit:  30 Performing: ACVISITTIMEPERFORM: Pre-visit chart review and note construction      Point of care: The treating physician provided 100% of care and consultation for this encounter.    Follow up: No follow-ups on file.     Electronically signed by Dallas Ortega DO on 1/16/2025 at 10:54 AM

## 2025-01-16 NOTE — PATIENT INSTRUCTIONS
and Gynecology Devices Panel of the Medical Devices. More recently, ACOG provided expert testimony at the February 12, 2019 meeting of the FDA Obstetrics and Gynecology Devices Panel of the Medical Devices Advisory Committee on the risks and benefits of transvaginal placement of surgical mesh for POP.     ACOG continues to support efforts to improve the safety and effectiveness of surgical options for women with pelvic organ prolapse. ACOG strongly supports continued audit and review of outcomes, as well as a registry for surveillance for all current vaginal mesh implants. For more information on recommendations for the safe and effective use of vaginal mesh for the repair of POP, see Practice Bulletin #185, “Pelvic Organ Prolapse,” developed jointly by ACOG and the American Urogynecologic Society and published in November 2017.     Additional information: · FDA press release (April 16, 2019): https://www.Cell[employer].gov/NewsEvents/Newsroom/PressAnnouncements/toi722206.htm     · ACOG Committee Opinion #694, “Management of Mesh and Graft Complications in Gynecologic Surgery,” developed jointly by ACOG and the American Urogynecologic Society (April 2017) https://www.acog.org/Clinical-Guidance-and-Publications/Committee Opinions/Nmlvjwdgi-ve-Lekhwihynnb-Practice/Management-of-Mesh-and-GraftComplications-in-Gynecologic-Surgery     · AUGS/SUFU Position Statement: Mesh Midurethral Slings for Stress Urinary Incontinence (supported by ACOG) https://www.augs.org/assets/1/6/AUGS-SUFU_MUS_Position_Statement.pdf     · ACOG Patient Education FAQ, Surgery for Pelvic Organ Prolapse: http://www.acog.org/Patients/FAQs/Surgery-for-Pelvic-Organ-Prolapse     · ACOG video, Pelvic Organ Prolapse: http://www.acog.org/Patients/Patient-Education-Videos/Pelvic-Organ-Prolapse     · [facility] Urogynecologic Surgical Mesh Implants webpage: https://www.fda.gov/medicaldevices/productsandmedicalprocedures/implantsandprosthet ics/urogynsurgicalmesh

## 2025-01-17 NOTE — TELEPHONE ENCOUNTER
LAST VISIT:   7/29/2024     Future Appointments   Date Time Provider Department Center   1/27/2025 10:45 AM Ender Joy MD [facility]       Pharmacy verified? Yes       [facility] - [place], [place] - [address] - P [phone] - F [phone] [address] [address]  Phone: [phone] Fax: [phone]

## 2025-01-20 RX ORDER — IMIPRAMINE HYDROCHLORIDE 50 MG/1
50 TABLET, FILM COATED ORAL NIGHTLY
Qty: 90 TABLET | Refills: 3 | Status: SHIPPED | OUTPATIENT
Start: 2025-01-20

## 2025-01-23 ENCOUNTER — PREP FOR PROCEDURE (OUTPATIENT)
Age: 65
End: 2025-01-23

## 2025-01-23 DIAGNOSIS — N81.6 RECTOCELE: ICD-10-CM

## 2025-01-23 DIAGNOSIS — Z01.818 PRE-OP TESTING: Primary | ICD-10-CM

## 2025-01-23 DIAGNOSIS — N31.8 HYPERTONICITY OF BLADDER: ICD-10-CM

## 2025-01-23 DIAGNOSIS — N81.9 VAGINAL VAULT PROLAPSE: ICD-10-CM

## 2025-01-23 DIAGNOSIS — N81.10 FEMALE CYSTOCELE: ICD-10-CM

## 2025-01-24 SDOH — ECONOMIC STABILITY: FOOD INSECURITY: WITHIN THE PAST 12 MONTHS, YOU WORRIED THAT YOUR FOOD WOULD RUN OUT BEFORE YOU GOT MONEY TO BUY MORE.: NEVER TRUE

## 2025-01-24 SDOH — ECONOMIC STABILITY: INCOME INSECURITY: IN THE LAST 12 MONTHS, WAS THERE A TIME WHEN YOU WERE NOT ABLE TO PAY THE MORTGAGE OR RENT ON TIME?: NO

## 2025-01-24 SDOH — ECONOMIC STABILITY: FOOD INSECURITY: WITHIN THE PAST 12 MONTHS, THE FOOD YOU BOUGHT JUST DIDN'T LAST AND YOU DIDN'T HAVE MONEY TO GET MORE.: NEVER TRUE

## 2025-01-24 SDOH — ECONOMIC STABILITY: TRANSPORTATION INSECURITY
IN THE PAST 12 MONTHS, HAS LACK OF TRANSPORTATION KEPT YOU FROM MEETINGS, WORK, OR FROM GETTING THINGS NEEDED FOR DAILY LIVING?: NO

## 2025-01-24 SDOH — ECONOMIC STABILITY: TRANSPORTATION INSECURITY
IN THE PAST 12 MONTHS, HAS THE LACK OF TRANSPORTATION KEPT YOU FROM MEDICAL APPOINTMENTS OR FROM GETTING MEDICATIONS?: NO

## 2025-01-24 ASSESSMENT — PATIENT HEALTH QUESTIONNAIRE - PHQ9
6. FEELING BAD ABOUT YOURSELF - OR THAT YOU ARE A FAILURE OR HAVE LET YOURSELF OR YOUR FAMILY DOWN: NOT AT ALL
3. TROUBLE FALLING OR STAYING ASLEEP: SEVERAL DAYS
8. MOVING OR SPEAKING SO SLOWLY THAT OTHER PEOPLE COULD HAVE NOTICED. OR THE OPPOSITE - BEING SO FIDGETY OR RESTLESS THAT YOU HAVE BEEN MOVING AROUND A LOT MORE THAN USUAL: NOT AT ALL
SUM OF ALL RESPONSES TO PHQ QUESTIONS 1-9: 3
7. TROUBLE CONCENTRATING ON THINGS, SUCH AS READING THE NEWSPAPER OR WATCHING TELEVISION: NOT AT ALL
7. TROUBLE CONCENTRATING ON THINGS, SUCH AS READING THE NEWSPAPER OR WATCHING TELEVISION: NOT AT ALL
2. FEELING DOWN, DEPRESSED OR HOPELESS: NOT AT ALL
5. POOR APPETITE OR OVEREATING: SEVERAL DAYS
SUM OF ALL RESPONSES TO PHQ QUESTIONS 1-9: 3
SUM OF ALL RESPONSES TO PHQ QUESTIONS 1-9: 3
5. POOR APPETITE OR OVEREATING: SEVERAL DAYS
10. IF YOU CHECKED OFF ANY PROBLEMS, HOW DIFFICULT HAVE THESE PROBLEMS MADE IT FOR YOU TO DO YOUR WORK, TAKE CARE OF THINGS AT HOME, OR GET ALONG WITH OTHER PEOPLE: NOT DIFFICULT AT ALL
9. THOUGHTS THAT YOU WOULD BE BETTER OFF DEAD, OR OF HURTING YOURSELF: NOT AT ALL
SUM OF ALL RESPONSES TO PHQ QUESTIONS 1-9: 3
10. IF YOU CHECKED OFF ANY PROBLEMS, HOW DIFFICULT HAVE THESE PROBLEMS MADE IT FOR YOU TO DO YOUR WORK, TAKE CARE OF THINGS AT HOME, OR GET ALONG WITH OTHER PEOPLE: NOT DIFFICULT AT ALL
3. TROUBLE FALLING OR STAYING ASLEEP: SEVERAL DAYS
4. FEELING TIRED OR HAVING LITTLE ENERGY: SEVERAL DAYS
SUM OF ALL RESPONSES TO PHQ9 QUESTIONS 1 & 2: 0
9. THOUGHTS THAT YOU WOULD BE BETTER OFF DEAD, OR OF HURTING YOURSELF: NOT AT ALL
8. MOVING OR SPEAKING SO SLOWLY THAT OTHER PEOPLE COULD HAVE NOTICED. OR THE OPPOSITE, BEING SO FIGETY OR RESTLESS THAT YOU HAVE BEEN MOVING AROUND A LOT MORE THAN USUAL: NOT AT ALL
1. LITTLE INTEREST OR PLEASURE IN DOING THINGS: NOT AT ALL
1. LITTLE INTEREST OR PLEASURE IN DOING THINGS: NOT AT ALL
6. FEELING BAD ABOUT YOURSELF - OR THAT YOU ARE A FAILURE OR HAVE LET YOURSELF OR YOUR FAMILY DOWN: NOT AT ALL
4. FEELING TIRED OR HAVING LITTLE ENERGY: SEVERAL DAYS
2. FEELING DOWN, DEPRESSED OR HOPELESS: NOT AT ALL
SUM OF ALL RESPONSES TO PHQ QUESTIONS 1-9: 3

## 2025-01-27 ENCOUNTER — OFFICE VISIT (OUTPATIENT)
Dept: PRIMARY CARE CLINIC | Age: 65
End: 2025-01-27
Payer: COMMERCIAL

## 2025-01-27 VITALS
WEIGHT: 178 LBS | OXYGEN SATURATION: 99 % | HEIGHT: 67 IN | HEART RATE: 82 BPM | BODY MASS INDEX: 27.94 KG/M2 | SYSTOLIC BLOOD PRESSURE: 122 MMHG | DIASTOLIC BLOOD PRESSURE: 78 MMHG

## 2025-01-27 DIAGNOSIS — N81.9 VAGINAL VAULT PROLAPSE: ICD-10-CM

## 2025-01-27 DIAGNOSIS — I49.3 FREQUENT PVCS: ICD-10-CM

## 2025-01-27 DIAGNOSIS — Z13.6 ENCOUNTER FOR LIPID SCREENING FOR CARDIOVASCULAR DISEASE: ICD-10-CM

## 2025-01-27 DIAGNOSIS — Z01.810 PREOP CARDIOVASCULAR EXAM: ICD-10-CM

## 2025-01-27 DIAGNOSIS — Z13.220 ENCOUNTER FOR LIPID SCREENING FOR CARDIOVASCULAR DISEASE: Primary | ICD-10-CM

## 2025-01-27 DIAGNOSIS — Z13.6 ENCOUNTER FOR LIPID SCREENING FOR CARDIOVASCULAR DISEASE: Primary | ICD-10-CM

## 2025-01-27 DIAGNOSIS — Z13.220 ENCOUNTER FOR LIPID SCREENING FOR CARDIOVASCULAR DISEASE: ICD-10-CM

## 2025-01-27 DIAGNOSIS — N81.10 FEMALE CYSTOCELE: ICD-10-CM

## 2025-01-27 DIAGNOSIS — Z01.818 PRE-OP TESTING: ICD-10-CM

## 2025-01-27 LAB
ANION GAP SERPL CALCULATED.3IONS-SCNC: 8 MMOL/L (ref 9–16)
BUN BLDV-MCNC: 13 MG/DL (ref 8–23)
CALCIUM SERPL-MCNC: 9.6 MG/DL (ref 8.6–10.4)
CHLORIDE BLD-SCNC: 100 MMOL/L (ref 98–107)
CO2: 27 MMOL/L (ref 20–31)
CREAT SERPL-MCNC: 0.8 MG/DL (ref 0.6–0.9)
GFR, ESTIMATED: 83 ML/MIN/1.73M2
GLUCOSE BLD-MCNC: 93 MG/DL (ref 74–99)
HCT VFR BLD CALC: 41.9 % (ref 36.3–47.1)
HEMOGLOBIN: 14.3 G/DL (ref 11.9–15.1)
MCH RBC QN AUTO: 31.4 PG (ref 25.2–33.5)
MCHC RBC AUTO-ENTMCNC: 34.1 G/DL (ref 28.4–34.8)
MCV RBC AUTO: 92.1 FL (ref 82.6–102.9)
NRBC AUTOMATED: 0 PER 100 WBC
PDW BLD-RTO: 12 % (ref 11.8–14.4)
PLATELET # BLD: 232 K/UL (ref 138–453)
PMV BLD AUTO: 10.1 FL (ref 8.1–13.5)
POTASSIUM SERPL-SCNC: 4.2 MMOL/L (ref 3.7–5.3)
RBC # BLD: 4.55 M/UL (ref 3.95–5.11)
SODIUM BLD-SCNC: 135 MMOL/L (ref 136–145)
WBC # BLD: 4.3 K/UL (ref 3.5–11.3)

## 2025-01-27 PROCEDURE — 99214 OFFICE O/P EST MOD 30 MIN: CPT | Performed by: FAMILY MEDICINE

## 2025-01-27 PROCEDURE — G2211 COMPLEX E/M VISIT ADD ON: HCPCS | Performed by: FAMILY MEDICINE

## 2025-01-27 PROCEDURE — 93000 ELECTROCARDIOGRAM COMPLETE: CPT | Performed by: FAMILY MEDICINE

## 2025-01-27 SDOH — ECONOMIC STABILITY: FOOD INSECURITY: WITHIN THE PAST 12 MONTHS, YOU WORRIED THAT YOUR FOOD WOULD RUN OUT BEFORE YOU GOT MONEY TO BUY MORE.: NEVER TRUE

## 2025-01-27 SDOH — ECONOMIC STABILITY: FOOD INSECURITY: WITHIN THE PAST 12 MONTHS, THE FOOD YOU BOUGHT JUST DIDN'T LAST AND YOU DIDN'T HAVE MONEY TO GET MORE.: NEVER TRUE

## 2025-01-27 ASSESSMENT — ENCOUNTER SYMPTOMS
RHINORRHEA: 0
VOMITING: 0
EYE DISCHARGE: 0
NAUSEA: 0
DIARRHEA: 0
COUGH: 0
EYE REDNESS: 0
SORE THROAT: 0
ABDOMINAL PAIN: 0
WHEEZING: 0
SHORTNESS OF BREATH: 0
CHEST TIGHTNESS: 0

## 2025-01-27 NOTE — PROGRESS NOTES
[facility]  [facility] PRIMARY CARE  [address]  [facility] [address]  Dept: [phone]    Ceci Marks is a 64 y.o. female Established patient, who presents today for her medical conditions/complaints as noted below.      Chief Complaint   Patient presents with    Pre-op Exam     Pre admission testing.        HPI:     History of Present Illness  The patient is a 64-year-old female who presents for surgical clearance.    She is scheduled for a surgical procedure involving the anchoring of her bladder, rectum, vaginal walls, and rectum. She has been diagnosed with rectocele, vaginal vault prolapse, and cystocele. She reports significant weight gain and expresses fear of physical activity, including walking her dog, due to a perceived risk of collapse. She has no history of anesthesia-related complications but typically receives an antiemetic injection as a precautionary measure. She has undergone two tests related to her condition.    She was diagnosed with VT approximately 2 years ago, which led to an increase in her blood pressure medication dosage. She has abstained from caffeine, chocolate, and alcohol for the past 2.5 years. Her last mammogram was conducted on 08/06/2024, following an implant replacement procedure performed by Dr. Lott at [place]. She was advised to allow time for healing before undergoing another mammogram. Her most recent colon cancer screening was approximately 7 years ago. She has been contacted for a follow-up colonoscopy but has deferred due to her upcoming surgery. She has not had a recent cholesterol check. She has been advised to undergo lab work and an EKG at a [facility] but has encountered difficulties locating one within her insurance network. She is uncertain if these tests can be performed at our facility. She consumed breakfast today.    She continues to take metoprolol 25 mg, initially prescribed for hypertension but later increased to

## 2025-01-27 NOTE — PROGRESS NOTES
Preoperative Instructions:    Stop eating solid foods at midnight the night prior to your surgery.     Stop drinking clear liquids at midnight the night prior to your surgery.    Arrive at the surgery center (3rd entrance) on __2-10-25_____________ by _____1030-1100__________.     Please stop any blood thinning medications as directed by your surgeon or prescribing physician. Failure to stop certain medications may interfere with your scheduled surgery. These may include: Aspirin, Coumadin, Plavix, NSAIDS (Motrin, Aleve, Advil, Mobic, Celebrex), Eliquis, Pradaxa, Xarelto, Fish oil, and herbal supplements.     You may continue the rest of your medications through the night before surgery unless instructed otherwise.     Day of surgery please take only the following medication(s) with a small sip of water:Omeprazole, Metoprolol      Please  shower with antibacterial soap  and water the day before and  the day of surgery.      Reminders:  -If you are going home the day of your procedure, you will need a family member or friend to stay during the procedure and drive you home after your procedure. Your  must be 18 years of age or older and able to sign off on your discharge instructions.    -If you are going home the same day of your surgery, someone must remain with you for the first 24 hours after your surgery if you receive sedation or anesthesia.     -Please do not wear any jewelry, lotions, contacts  or body piercing the day of surgery

## 2025-01-28 ENCOUNTER — HOSPITAL ENCOUNTER (OUTPATIENT)
Age: 65
Discharge: HOME | End: 2025-01-28

## 2025-01-29 ENCOUNTER — TELEPHONE (OUTPATIENT)
Age: 65
End: 2025-01-29

## 2025-01-29 NOTE — TELEPHONE ENCOUNTER
Call from [facility] Authorization Dept - pt's surgery date 2/10/25 - all codes are approved from 1/28/25 through 3/28/25 - Auth #[id number]

## 2025-02-06 ENCOUNTER — ANESTHESIA EVENT (OUTPATIENT)
Dept: OPERATING ROOM | Age: 65
End: 2025-02-06
Payer: COMMERCIAL

## 2025-02-09 NOTE — H&P
UroGyn Pre-Op H&P  [facility]    Patient Name: Ceci Marks     Patient : 1960  Room/Bed: [facility] OR [facility]/NONE  Admission Date/Time: 2/10/2025 10:46 AM  Primary Care Physician: Ender Joy MD  MRN: [id number]    Date: 2/10/2025  Time: 11:36 AM    The patient was seen in pre-op holding. She is here for Robotic Sacral Colpopexy witih Restorelle mesh, Altis Sling Insertion, Cystoscopy, possible anterior vaginal repair, possible posterior vaginal repair.    The patient is being admitted for a planned elective surgical procedure today. She has had symptoms, physical findings, and diagnostic testing that have an appropriate indication for today's planned procedure. The patient has been educated about their condition, conservative and surgical options was been offered to the patient, and the patient has decided to proceed with a surgical option. An informed consent has been signed under no duress or confusion. If indicated, the patient has been surgically cleared by her PCP and /or any pertinent specialist. All labs and testing have been reviewed. If of reproductive age and without permanent sterilization, a pregnancy test was confirmed as negative. The patient has satisfactorily completed any pre-operative preparations prior to surgery. If MRSA positive, she had completed the standard surgical preparation protocol. The patient took any required medicines prior to surgery with a sip of water but otherwise had taken nothing by mouth. The patient has discontinued any blood thinners as required to proceed with surgery. The patient denies chest pain, shortness of breath, calf pain, or open sores on the day of surgery. All dentures and body jewelry have been removed and / or taped.      REVIEW OF SYSTEMS:  14 point ROS negative except for above listed in HPI.   General/Constitutional: Denies chills, fever, headaches, weight gain, weight loss   Ophthalmologic: Denies recent abrupt vision

## 2025-02-09 NOTE — DISCHARGE SUMMARY
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]  Patient Active Problem List   Diagnosis    Irregular heart rate    Diverticulosis of large intestine    Cataracts, bilateral    Glaucoma primary, open angle    Trichiasis of eyelid of right eye    High cholesterol    GERD (gastroesophageal reflux disease)    Frequent PVCs    Chronic migraine without aura without status migrainosus, not intractable    Depression    Pain of right lower extremity    Nonsustained ventricular tachycardia (HCC)    Intermittent palpitations    Coronary artery disease involving native coronary artery of native heart without angina pectoris    Abnormal electrocardiography    Adhesive capsulitis of left shoulder    Dyspnea on exertion    History of migraine    Pre-syncope    Frequent urination    Unable to empty bladder    Vaginal vault prolapse    Rectocele    Female cystocele    Hypertonicity of bladder       Infection: {YES / NO:}  Hospital Acquired: {YES / NO:}    Surgical Operations & Procedures: ***    Consultations: {consultation:93609::\"Anesthesia\"}    Pertinent Findings & Procedures:   Ceci Marks is a 64 y.o. female , admitted for elective surgery; received ***.  She underwent Robotic Sacral Colpopexy, Bladder Sling Insertion, Cystoscopy, possible anterior vaginal repair, possible posterior vaginal repair *** on ***.  POD#*** labs were ***. Post-operatively, patient voided *** with PVR *** . She was discharged home with*** a waldrop catheter. Post-op course normal, discharged home on POD# ***.  Follow up in 1

## 2025-02-09 NOTE — BRIEF OP NOTE
Brief Operative Note  Department of Gynecology  [facility]     Patient: Ceci Marks   : 1960  MRN: [id number]       Acct: [id number]   Date of Procedure: 2/10/25     Pre-operative Diagnosis:   64 y.o. female    Vaginal Vault Prolapse  Cystocele  Rectocele  Frequency-Urgency Syndrome  Urinary Retention  Vaginal Atrophy   Coronary Artery Disease  History of Ventricular Tachycardia  Depression  Hypertension  Diverticulosis   History of Hysterectomy   BMI 27    Post-operative Diagnosis:   Vaginal Vault Prolapse (repaired)  Cystocele (resolved)  Rectocele (resolved)  Frequency-Urgency Syndrome  Urinary Retention  Vaginal Atrophy   Coronary Artery Disease  History of Ventricular Tachycardia  Depression  Hypertension  Diverticulosis   History of Hysterectomy   BMI 27    Procedure: Robotic Sacral Colpopexy with Restorelle Mesh, Altis Sling Insertion, Cystoscopy    Surgeon: Dr. Dallas Ortega DO     Assistant(s): Jane Newsome DO, PGY4; Cierra Santacruz MD, PGY1    Anesthesia: general via ET    Findings:  Normal appearing female external genitalia normal appearing vaginal mucosa, vaginal vault prolapse, cystocele and rectocele present, vaginal vault prolapse repaired with sacralcolpopexy, cystocele and rectocele resolved after sacralcolpopexy and did not require repair, normal appearing liver edge, normal appearing small and large bowel, normal appearing urethra and bladder without evidence of injury or suture, bilateral ureteral peristalsis and jets  Total IV fluids/Blood products:  1400 ml crystalloid  Urine Output:  500 ml    Estimated blood loss:  30 mL  Drains:  waldrop catheter. Implant is Restorelle mesh and Altis sling  Specimens:  None  Instrument and Sponge Count: Correct  Complications:  none  Condition:  stable, transferred to post anesthesia recovery      Jane Newsome DO  UroGyn Resident  2/10/2025, 3:32 PM

## 2025-02-10 ENCOUNTER — ANESTHESIA (OUTPATIENT)
Dept: OPERATING ROOM | Age: 65
End: 2025-02-10
Payer: COMMERCIAL

## 2025-02-10 ENCOUNTER — HOSPITAL ENCOUNTER (OUTPATIENT)
Age: 65
Setting detail: OUTPATIENT SURGERY
Discharge: HOME | End: 2025-02-10
Attending: OBSTETRICS & GYNECOLOGY | Admitting: OBSTETRICS & GYNECOLOGY
Payer: COMMERCIAL

## 2025-02-10 VITALS
OXYGEN SATURATION: 100 % | HEIGHT: 67 IN | DIASTOLIC BLOOD PRESSURE: 73 MMHG | BODY MASS INDEX: 28.25 KG/M2 | SYSTOLIC BLOOD PRESSURE: 144 MMHG | HEART RATE: 61 BPM | TEMPERATURE: 97.7 F | RESPIRATION RATE: 12 BRPM | WEIGHT: 180 LBS

## 2025-02-10 DIAGNOSIS — G89.18 POST-OP PAIN: Primary | ICD-10-CM

## 2025-02-10 PROBLEM — Z98.890 POSTOPERATIVE STATE: Status: ACTIVE | Noted: 2025-02-10

## 2025-02-10 LAB
HCT VFR BLD AUTO: 37 % (ref 36–46)
HGB BLD-MCNC: 13.2 G/DL (ref 12–16)

## 2025-02-10 PROCEDURE — 7100000000 HC PACU RECOVERY - FIRST 15 MIN: Performed by: OBSTETRICS & GYNECOLOGY

## 2025-02-10 PROCEDURE — 57288 REPAIR BLADDER DEFECT: CPT | Performed by: OBSTETRICS & GYNECOLOGY

## 2025-02-10 PROCEDURE — 6370000000 HC RX 637 (ALT 250 FOR IP): Performed by: ANESTHESIOLOGY

## 2025-02-10 PROCEDURE — 6360000002 HC RX W HCPCS: Performed by: ANESTHESIOLOGY

## 2025-02-10 PROCEDURE — 3600000019 HC SURGERY ROBOT ADDTL 15MIN: Performed by: OBSTETRICS & GYNECOLOGY

## 2025-02-10 PROCEDURE — 3700000000 HC ANESTHESIA ATTENDED CARE: Performed by: OBSTETRICS & GYNECOLOGY

## 2025-02-10 PROCEDURE — 57425 LAPAROSCOPY SURG COLPOPEXY: CPT | Performed by: OBSTETRICS & GYNECOLOGY

## 2025-02-10 PROCEDURE — C1781 MESH (IMPLANTABLE): HCPCS | Performed by: OBSTETRICS & GYNECOLOGY

## 2025-02-10 PROCEDURE — 7100000010 HC PHASE II RECOVERY - FIRST 15 MIN: Performed by: OBSTETRICS & GYNECOLOGY

## 2025-02-10 PROCEDURE — 85018 HEMOGLOBIN: CPT

## 2025-02-10 PROCEDURE — 2500000003 HC RX 250 WO HCPCS: Performed by: SPECIALIST

## 2025-02-10 PROCEDURE — 3600000009 HC SURGERY ROBOT BASE: Performed by: OBSTETRICS & GYNECOLOGY

## 2025-02-10 PROCEDURE — 2709999900 HC NON-CHARGEABLE SUPPLY: Performed by: OBSTETRICS & GYNECOLOGY

## 2025-02-10 PROCEDURE — 2580000003 HC RX 258

## 2025-02-10 PROCEDURE — 6360000002 HC RX W HCPCS

## 2025-02-10 PROCEDURE — S2900 ROBOTIC SURGICAL SYSTEM: HCPCS | Performed by: OBSTETRICS & GYNECOLOGY

## 2025-02-10 PROCEDURE — 64488 TAP BLOCK BI INJECTION: CPT | Performed by: ANESTHESIOLOGY

## 2025-02-10 PROCEDURE — 51725 SIMPLE CYSTOMETROGRAM: CPT | Performed by: OBSTETRICS & GYNECOLOGY

## 2025-02-10 PROCEDURE — 6360000002 HC RX W HCPCS: Performed by: STUDENT IN AN ORGANIZED HEALTH CARE EDUCATION/TRAINING PROGRAM

## 2025-02-10 PROCEDURE — 7100000011 HC PHASE II RECOVERY - ADDTL 15 MIN: Performed by: OBSTETRICS & GYNECOLOGY

## 2025-02-10 PROCEDURE — 2500000003 HC RX 250 WO HCPCS

## 2025-02-10 PROCEDURE — 85014 HEMATOCRIT: CPT

## 2025-02-10 PROCEDURE — 2580000003 HC RX 258: Performed by: ANESTHESIOLOGY

## 2025-02-10 PROCEDURE — 7100000001 HC PACU RECOVERY - ADDTL 15 MIN: Performed by: OBSTETRICS & GYNECOLOGY

## 2025-02-10 PROCEDURE — 3700000001 HC ADD 15 MINUTES (ANESTHESIA): Performed by: OBSTETRICS & GYNECOLOGY

## 2025-02-10 PROCEDURE — 2500000003 HC RX 250 WO HCPCS: Performed by: ANESTHESIOLOGY

## 2025-02-10 PROCEDURE — 6370000000 HC RX 637 (ALT 250 FOR IP): Performed by: STUDENT IN AN ORGANIZED HEALTH CARE EDUCATION/TRAINING PROGRAM

## 2025-02-10 PROCEDURE — 6360000002 HC RX W HCPCS: Performed by: OBSTETRICS & GYNECOLOGY

## 2025-02-10 DEVICE — SINGLE INCISION SLING SYSTEM
Type: IMPLANTABLE DEVICE | Site: PELVIS | Status: FUNCTIONAL
Brand: ALTIS

## 2025-02-10 DEVICE — POLYPROPYLENE MESH FOR SACROCOLPOSUSPENSION/SACROCOLPOPEXY - Y
Type: IMPLANTABLE DEVICE | Site: PELVIS | Status: FUNCTIONAL
Brand: RESTORELLE

## 2025-02-10 RX ORDER — IPRATROPIUM BROMIDE AND ALBUTEROL SULFATE 2.5; .5 MG/3ML; MG/3ML
1 SOLUTION RESPIRATORY (INHALATION)
Status: DISCONTINUED | OUTPATIENT
Start: 2025-02-10 | End: 2025-02-10 | Stop reason: HOSPADM

## 2025-02-10 RX ORDER — SCOPOLAMINE 1 MG/3D
1 PATCH, EXTENDED RELEASE TRANSDERMAL
Status: DISCONTINUED | OUTPATIENT
Start: 2025-02-10 | End: 2025-02-10 | Stop reason: HOSPADM

## 2025-02-10 RX ORDER — ROCURONIUM BROMIDE 10 MG/ML
INJECTION, SOLUTION INTRAVENOUS
Status: DISCONTINUED | OUTPATIENT
Start: 2025-02-10 | End: 2025-02-10 | Stop reason: SDUPTHER

## 2025-02-10 RX ORDER — KETOROLAC TROMETHAMINE 30 MG/ML
INJECTION, SOLUTION INTRAMUSCULAR; INTRAVENOUS
Status: DISCONTINUED | OUTPATIENT
Start: 2025-02-10 | End: 2025-02-10 | Stop reason: SDUPTHER

## 2025-02-10 RX ORDER — SODIUM CHLORIDE 0.9 % (FLUSH) 0.9 %
5-40 SYRINGE (ML) INJECTION EVERY 12 HOURS SCHEDULED
Status: DISCONTINUED | OUTPATIENT
Start: 2025-02-10 | End: 2025-02-10 | Stop reason: HOSPADM

## 2025-02-10 RX ORDER — DEXAMETHASONE SODIUM PHOSPHATE 10 MG/ML
INJECTION, SOLUTION INTRAMUSCULAR; INTRAVENOUS
Status: COMPLETED | OUTPATIENT
Start: 2025-02-10 | End: 2025-02-10

## 2025-02-10 RX ORDER — SODIUM CHLORIDE 9 MG/ML
INJECTION, SOLUTION INTRAVENOUS PRN
Status: DISCONTINUED | OUTPATIENT
Start: 2025-02-10 | End: 2025-02-10 | Stop reason: HOSPADM

## 2025-02-10 RX ORDER — SODIUM CHLORIDE 0.9 % (FLUSH) 0.9 %
5-40 SYRINGE (ML) INJECTION PRN
Status: DISCONTINUED | OUTPATIENT
Start: 2025-02-10 | End: 2025-02-10 | Stop reason: HOSPADM

## 2025-02-10 RX ORDER — LABETALOL HYDROCHLORIDE 5 MG/ML
10 INJECTION, SOLUTION INTRAVENOUS
Status: DISCONTINUED | OUTPATIENT
Start: 2025-02-10 | End: 2025-02-10 | Stop reason: HOSPADM

## 2025-02-10 RX ORDER — DEXAMETHASONE SODIUM PHOSPHATE 4 MG/ML
INJECTION, SOLUTION INTRA-ARTICULAR; INTRALESIONAL; INTRAMUSCULAR; INTRAVENOUS; SOFT TISSUE
Status: DISCONTINUED | OUTPATIENT
Start: 2025-02-10 | End: 2025-02-10 | Stop reason: SDUPTHER

## 2025-02-10 RX ORDER — MIDAZOLAM HYDROCHLORIDE 2 MG/2ML
2 INJECTION, SOLUTION INTRAMUSCULAR; INTRAVENOUS
Status: DISCONTINUED | OUTPATIENT
Start: 2025-02-10 | End: 2025-02-10 | Stop reason: HOSPADM

## 2025-02-10 RX ORDER — FENTANYL CITRATE 50 UG/ML
100 INJECTION, SOLUTION INTRAMUSCULAR; INTRAVENOUS ONCE
Status: COMPLETED | OUTPATIENT
Start: 2025-02-10 | End: 2025-02-10

## 2025-02-10 RX ORDER — OXYCODONE AND ACETAMINOPHEN 5; 325 MG/1; MG/1
2 TABLET ORAL
Status: DISCONTINUED | OUTPATIENT
Start: 2025-02-10 | End: 2025-02-10 | Stop reason: HOSPADM

## 2025-02-10 RX ORDER — PROPOFOL 10 MG/ML
INJECTION, EMULSION INTRAVENOUS
Status: DISCONTINUED | OUTPATIENT
Start: 2025-02-10 | End: 2025-02-10 | Stop reason: SDUPTHER

## 2025-02-10 RX ORDER — HYDRALAZINE HYDROCHLORIDE 20 MG/ML
10 INJECTION INTRAMUSCULAR; INTRAVENOUS
Status: DISCONTINUED | OUTPATIENT
Start: 2025-02-10 | End: 2025-02-10 | Stop reason: HOSPADM

## 2025-02-10 RX ORDER — FENTANYL CITRATE 50 UG/ML
100 INJECTION, SOLUTION INTRAMUSCULAR; INTRAVENOUS ONCE
Status: CANCELLED | OUTPATIENT
Start: 2025-02-10 | End: 2025-02-10

## 2025-02-10 RX ORDER — NALOXONE HYDROCHLORIDE 0.4 MG/ML
INJECTION, SOLUTION INTRAMUSCULAR; INTRAVENOUS; SUBCUTANEOUS PRN
Status: DISCONTINUED | OUTPATIENT
Start: 2025-02-10 | End: 2025-02-10 | Stop reason: HOSPADM

## 2025-02-10 RX ORDER — ONDANSETRON 2 MG/ML
INJECTION INTRAMUSCULAR; INTRAVENOUS
Status: DISCONTINUED | OUTPATIENT
Start: 2025-02-10 | End: 2025-02-10 | Stop reason: SDUPTHER

## 2025-02-10 RX ORDER — GLYCOPYRROLATE 0.2 MG/ML
0.4 INJECTION INTRAMUSCULAR; INTRAVENOUS ONCE
Status: DISCONTINUED | OUTPATIENT
Start: 2025-02-10 | End: 2025-02-10 | Stop reason: HOSPADM

## 2025-02-10 RX ORDER — MIDAZOLAM HYDROCHLORIDE 2 MG/2ML
2 INJECTION, SOLUTION INTRAMUSCULAR; INTRAVENOUS
Status: COMPLETED | OUTPATIENT
Start: 2025-02-10 | End: 2025-02-10

## 2025-02-10 RX ORDER — FENTANYL CITRATE 50 UG/ML
INJECTION, SOLUTION INTRAMUSCULAR; INTRAVENOUS
Status: COMPLETED
Start: 2025-02-10 | End: 2025-02-10

## 2025-02-10 RX ORDER — ONDANSETRON 2 MG/ML
4 INJECTION INTRAMUSCULAR; INTRAVENOUS
Status: DISCONTINUED | OUTPATIENT
Start: 2025-02-10 | End: 2025-02-10 | Stop reason: HOSPADM

## 2025-02-10 RX ORDER — IBUPROFEN 600 MG/1
600 TABLET, FILM COATED ORAL EVERY 6 HOURS PRN
Qty: 30 TABLET | Refills: 1 | Status: SHIPPED | OUTPATIENT
Start: 2025-02-10

## 2025-02-10 RX ORDER — SODIUM CHLORIDE, SODIUM LACTATE, POTASSIUM CHLORIDE, CALCIUM CHLORIDE 600; 310; 30; 20 MG/100ML; MG/100ML; MG/100ML; MG/100ML
INJECTION, SOLUTION INTRAVENOUS
Status: DISCONTINUED | OUTPATIENT
Start: 2025-02-10 | End: 2025-02-10 | Stop reason: SDUPTHER

## 2025-02-10 RX ORDER — CEPHALEXIN 500 MG/1
500 CAPSULE ORAL 2 TIMES DAILY
Qty: 14 CAPSULE | Refills: 0 | Status: SHIPPED | OUTPATIENT
Start: 2025-02-10 | End: 2025-02-17

## 2025-02-10 RX ORDER — OXYCODONE HYDROCHLORIDE 5 MG/1
5 TABLET ORAL EVERY 6 HOURS PRN
Qty: 20 TABLET | Refills: 0 | Status: SHIPPED | OUTPATIENT
Start: 2025-02-10 | End: 2025-02-15

## 2025-02-10 RX ORDER — FENTANYL CITRATE 50 UG/ML
INJECTION, SOLUTION INTRAMUSCULAR; INTRAVENOUS
Status: DISCONTINUED | OUTPATIENT
Start: 2025-02-10 | End: 2025-02-10 | Stop reason: SDUPTHER

## 2025-02-10 RX ORDER — DIPHENHYDRAMINE HYDROCHLORIDE 50 MG/ML
12.5 INJECTION INTRAMUSCULAR; INTRAVENOUS
Status: DISCONTINUED | OUTPATIENT
Start: 2025-02-10 | End: 2025-02-10 | Stop reason: HOSPADM

## 2025-02-10 RX ORDER — MIDAZOLAM HYDROCHLORIDE 2 MG/2ML
2 INJECTION, SOLUTION INTRAMUSCULAR; INTRAVENOUS ONCE
Status: CANCELLED | OUTPATIENT
Start: 2025-02-10 | End: 2025-02-10

## 2025-02-10 RX ORDER — LIDOCAINE HYDROCHLORIDE 10 MG/ML
INJECTION, SOLUTION INFILTRATION; PERINEURAL
Status: COMPLETED
Start: 2025-02-10 | End: 2025-02-10

## 2025-02-10 RX ORDER — LIDOCAINE HYDROCHLORIDE AND EPINEPHRINE 10; 10 MG/ML; UG/ML
INJECTION, SOLUTION INFILTRATION; PERINEURAL PRN
Status: DISCONTINUED | OUTPATIENT
Start: 2025-02-10 | End: 2025-02-10 | Stop reason: ALTCHOICE

## 2025-02-10 RX ORDER — GABAPENTIN 300 MG/1
300 CAPSULE ORAL ONCE
Status: COMPLETED | OUTPATIENT
Start: 2025-02-10 | End: 2025-02-10

## 2025-02-10 RX ORDER — METOCLOPRAMIDE HYDROCHLORIDE 5 MG/ML
10 INJECTION INTRAMUSCULAR; INTRAVENOUS
Status: DISCONTINUED | OUTPATIENT
Start: 2025-02-10 | End: 2025-02-10 | Stop reason: HOSPADM

## 2025-02-10 RX ORDER — ONDANSETRON 4 MG/1
4 TABLET, FILM COATED ORAL EVERY 8 HOURS PRN
Qty: 30 TABLET | Refills: 0 | Status: SHIPPED | OUTPATIENT
Start: 2025-02-10

## 2025-02-10 RX ORDER — APREPITANT 40 MG/1
40 CAPSULE ORAL ONCE
Status: CANCELLED | OUTPATIENT
Start: 2025-02-10 | End: 2025-02-10

## 2025-02-10 RX ORDER — SODIUM CHLORIDE, SODIUM LACTATE, POTASSIUM CHLORIDE, CALCIUM CHLORIDE 600; 310; 30; 20 MG/100ML; MG/100ML; MG/100ML; MG/100ML
INJECTION, SOLUTION INTRAVENOUS CONTINUOUS
Status: DISCONTINUED | OUTPATIENT
Start: 2025-02-10 | End: 2025-02-10 | Stop reason: HOSPADM

## 2025-02-10 RX ORDER — OXYCODONE AND ACETAMINOPHEN 5; 325 MG/1; MG/1
1 TABLET ORAL
Status: COMPLETED | OUTPATIENT
Start: 2025-02-10 | End: 2025-02-10

## 2025-02-10 RX ORDER — SENNA AND DOCUSATE SODIUM 50; 8.6 MG/1; MG/1
2 TABLET, FILM COATED ORAL 2 TIMES DAILY PRN
Qty: 60 TABLET | Refills: 1 | Status: SHIPPED | OUTPATIENT
Start: 2025-02-10

## 2025-02-10 RX ORDER — LIDOCAINE HYDROCHLORIDE AND EPINEPHRINE 10; 10 MG/ML; UG/ML
INJECTION, SOLUTION INFILTRATION; PERINEURAL
Status: DISCONTINUED
Start: 2025-02-10 | End: 2025-02-10 | Stop reason: HOSPADM

## 2025-02-10 RX ORDER — MORPHINE SULFATE 2 MG/ML
2 INJECTION, SOLUTION INTRAMUSCULAR; INTRAVENOUS EVERY 5 MIN PRN
Status: DISCONTINUED | OUTPATIENT
Start: 2025-02-10 | End: 2025-02-10 | Stop reason: HOSPADM

## 2025-02-10 RX ORDER — PHENAZOPYRIDINE HYDROCHLORIDE 100 MG/1
100 TABLET, FILM COATED ORAL ONCE
Status: COMPLETED | OUTPATIENT
Start: 2025-02-10 | End: 2025-02-10

## 2025-02-10 RX ORDER — CELECOXIB 100 MG/1
200 CAPSULE ORAL ONCE
Status: COMPLETED | OUTPATIENT
Start: 2025-02-10 | End: 2025-02-10

## 2025-02-10 RX ORDER — APREPITANT 40 MG/1
CAPSULE ORAL
Status: COMPLETED
Start: 2025-02-10 | End: 2025-02-10

## 2025-02-10 RX ORDER — LIDOCAINE HYDROCHLORIDE 10 MG/ML
INJECTION, SOLUTION EPIDURAL; INFILTRATION; INTRACAUDAL; PERINEURAL
Status: DISCONTINUED | OUTPATIENT
Start: 2025-02-10 | End: 2025-02-10 | Stop reason: SDUPTHER

## 2025-02-10 RX ORDER — BUPIVACAINE HYDROCHLORIDE 2.5 MG/ML
INJECTION, SOLUTION EPIDURAL; INFILTRATION; INTRACAUDAL
Status: COMPLETED | OUTPATIENT
Start: 2025-02-10 | End: 2025-02-10

## 2025-02-10 RX ORDER — FAMOTIDINE 10 MG/ML
INJECTION, SOLUTION INTRAVENOUS
Status: COMPLETED
Start: 2025-02-10 | End: 2025-02-10

## 2025-02-10 RX ADMIN — GABAPENTIN 300 MG: 300 CAPSULE ORAL at 11:38

## 2025-02-10 RX ADMIN — SODIUM CHLORIDE, POTASSIUM CHLORIDE, SODIUM LACTATE AND CALCIUM CHLORIDE: 600; 310; 30; 20 INJECTION, SOLUTION INTRAVENOUS at 11:35

## 2025-02-10 RX ADMIN — OXYCODONE HYDROCHLORIDE AND ACETAMINOPHEN 1 TABLET: 5; 325 TABLET ORAL at 16:41

## 2025-02-10 RX ADMIN — PROPOFOL 50 MG: 10 INJECTION, EMULSION INTRAVENOUS at 15:12

## 2025-02-10 RX ADMIN — APREPITANT 40 MG: 40 CAPSULE ORAL at 12:05

## 2025-02-10 RX ADMIN — FENTANYL CITRATE 75 MCG: 50 INJECTION, SOLUTION INTRAMUSCULAR; INTRAVENOUS at 13:23

## 2025-02-10 RX ADMIN — FENTANYL CITRATE 100 MCG: 50 INJECTION, SOLUTION INTRAMUSCULAR; INTRAVENOUS at 12:25

## 2025-02-10 RX ADMIN — ROCURONIUM BROMIDE 10 MG: 10 INJECTION, SOLUTION INTRAVENOUS at 14:56

## 2025-02-10 RX ADMIN — FENTANYL CITRATE 25 MCG: 50 INJECTION, SOLUTION INTRAMUSCULAR; INTRAVENOUS at 14:20

## 2025-02-10 RX ADMIN — LIDOCAINE HYDROCHLORIDE 50 MG: 10 INJECTION, SOLUTION EPIDURAL; INFILTRATION; INTRACAUDAL; PERINEURAL at 13:23

## 2025-02-10 RX ADMIN — DEXAMETHASONE SODIUM PHOSPHATE 8 MG: 4 INJECTION INTRA-ARTICULAR; INTRALESIONAL; INTRAMUSCULAR; INTRAVENOUS; SOFT TISSUE at 13:30

## 2025-02-10 RX ADMIN — SODIUM CHLORIDE, POTASSIUM CHLORIDE, SODIUM LACTATE AND CALCIUM CHLORIDE: 600; 310; 30; 20 INJECTION, SOLUTION INTRAVENOUS at 13:21

## 2025-02-10 RX ADMIN — FAMOTIDINE 20 MG: 10 INJECTION, SOLUTION INTRAVENOUS at 12:06

## 2025-02-10 RX ADMIN — KETOROLAC TROMETHAMINE 30 MG: 30 INJECTION, SOLUTION INTRAMUSCULAR at 15:09

## 2025-02-10 RX ADMIN — SUGAMMADEX 200 MG: 100 INJECTION, SOLUTION INTRAVENOUS at 15:27

## 2025-02-10 RX ADMIN — ROCURONIUM BROMIDE 10 MG: 10 INJECTION, SOLUTION INTRAVENOUS at 14:30

## 2025-02-10 RX ADMIN — Medication 2000 MG: at 13:35

## 2025-02-10 RX ADMIN — BUPIVACAINE HYDROCHLORIDE 80 ML: 2.5 INJECTION, SOLUTION EPIDURAL; INFILTRATION; INTRACAUDAL; PERINEURAL at 12:30

## 2025-02-10 RX ADMIN — ONDANSETRON 4 MG: 2 INJECTION INTRAMUSCULAR; INTRAVENOUS at 15:04

## 2025-02-10 RX ADMIN — MIDAZOLAM HYDROCHLORIDE 2 MG: 1 INJECTION, SOLUTION INTRAMUSCULAR; INTRAVENOUS at 12:25

## 2025-02-10 RX ADMIN — DEXAMETHASONE SODIUM PHOSPHATE 10 MG: 10 INJECTION, SOLUTION INTRAMUSCULAR; INTRAVENOUS at 12:30

## 2025-02-10 RX ADMIN — SODIUM CHLORIDE, PRESERVATIVE FREE 10 ML: 5 INJECTION INTRAVENOUS at 12:06

## 2025-02-10 RX ADMIN — PHENAZOPYRIDINE HYDROCHLORIDE 100 MG: 100 TABLET ORAL at 11:37

## 2025-02-10 RX ADMIN — PROPOFOL 150 MG: 10 INJECTION, EMULSION INTRAVENOUS at 13:23

## 2025-02-10 RX ADMIN — ROCURONIUM BROMIDE 20 MG: 10 INJECTION, SOLUTION INTRAVENOUS at 13:40

## 2025-02-10 RX ADMIN — SODIUM CHLORIDE, POTASSIUM CHLORIDE, SODIUM LACTATE AND CALCIUM CHLORIDE: 600; 310; 30; 20 INJECTION, SOLUTION INTRAVENOUS at 14:35

## 2025-02-10 RX ADMIN — SODIUM CHLORIDE, PRESERVATIVE FREE 10 ML: 5 INJECTION INTRAVENOUS at 11:34

## 2025-02-10 RX ADMIN — ROCURONIUM BROMIDE 10 MG: 10 INJECTION, SOLUTION INTRAVENOUS at 14:00

## 2025-02-10 RX ADMIN — CELECOXIB 200 MG: 100 CAPSULE ORAL at 11:38

## 2025-02-10 RX ADMIN — ROCURONIUM BROMIDE 50 MG: 10 INJECTION, SOLUTION INTRAVENOUS at 13:23

## 2025-02-10 ASSESSMENT — PAIN DESCRIPTION - DESCRIPTORS
DESCRIPTORS: SORE

## 2025-02-10 ASSESSMENT — PAIN - FUNCTIONAL ASSESSMENT
PAIN_FUNCTIONAL_ASSESSMENT: FACE, LEGS, ACTIVITY, CRY, AND CONSOLABILITY (FLACC)
PAIN_FUNCTIONAL_ASSESSMENT: NONE - DENIES PAIN

## 2025-02-10 ASSESSMENT — PAIN DESCRIPTION - LOCATION
LOCATION: ABDOMEN
LOCATION: ABDOMEN
LOCATION: ABDOMEN;VAGINA

## 2025-02-10 ASSESSMENT — PAIN SCALES - GENERAL
PAINLEVEL_OUTOF10: 5
PAINLEVEL_OUTOF10: 4
PAINLEVEL_OUTOF10: 3

## 2025-02-10 NOTE — OP NOTE
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 Item Serial No.  Lot No. LRB No. Used Action   MESH GYN [id number] POLYPR UNIDIR Y CNTOUR FOR TRANSABDOMINAL - [id number]  MESH GYN [id number] POLYPR UNIDIR Y CNTOUR FOR TRANSABDOMINAL  COLOPLAST YAIR-WD [id number] N/A 1 Implanted   SYSTEM SLNG SGL INCIS W/ INTRO ANCHR TENSIONING SUT ALTIS - [id number]  SYSTEM SLNG SGL INCIS W/ INTRO ANCHR TENSIONING SUT ALTIS  COLOPLAST YAIR-WD [id number] N/A 1 Implanted         Drains:   Urinary Catheter 02/10/25 Denson (Active)       [REMOVED] Urinary Catheter 02/10/25 Denson (Removed)       Findings:  Infection Present At Time Of Surgery (PATOS) (choose all levels that have infection present):  No infection present  Other Findings: as above    Detailed Description of Procedure:   The patient is being admitted for a planned elective surgical procedure today. The patient has symptoms, physical findings, and diagnostic testing meeting appropriate indications for today's planned procedure. The patient has been educated about their condition, conservative and surgical options have been offered to the patient, and the patient has decided to proceed with a surgical option. In the preoperative holding area prior to the procedure, the operative plan, including risks, benefits and alternatives was reviewed with the

## 2025-02-10 NOTE — ANESTHESIA PRE PROCEDURE
Department of Anesthesiology  Preprocedure Note       Name:  Ceci Marks   Age:  64 y.o.  :  1960                                          MRN:  [id number]         Date:  2/10/2025      Surgeon: Surgeon(s):  Dallas Ortega DO    Procedure: Procedure(s):  ROBOTIC SACRAL COLPOPEXY WITH RESTORELLE MESH ALTIS BLADDER SLING INSERTION AND CYSTOSCOPY possible VAGINAL REPAIR ANTERIOR AND POSTERIOR    Medications prior to admission:   Prior to Admission medications    Medication Sig Start Date End Date Taking? Authorizing Provider   ibuprofen (ADVIL;MOTRIN) 600 MG tablet Take 1 tablet by mouth every 6 hours as needed for Pain 2/10/25  Yes Jane Newsome DO   oxyCODONE (ROXICODONE) 5 MG immediate release tablet Take 1 tablet by mouth every 6 hours as needed for Pain for up to 5 days. Intended supply: 5 days. Take lowest dose possible to manage pain Max Daily Amount: 20 mg 2/10/25 2/15/25 Yes Jane Newsome DO   sennosides-docusate sodium (SENOKOT-S) 8.6-50 MG tablet Take 2 tablets by mouth 2 times daily as needed for Constipation 2/10/25  Yes Jane Newsome DO   ondansetron (ZOFRAN) 4 MG tablet Take 1 tablet by mouth every 8 hours as needed for Nausea or Vomiting 2/10/25  Yes Jane Newsome DO   cephALEXin (KEFLEX) 500 MG capsule Take 1 capsule by mouth 2 times daily for 7 days Take for 7 days if discharged home with a waldrop catheter. Take for only 5 days if discharged home without waldrop catheter 2/10/25 2/17/25 Yes Jane Newsome DO   omeprazole (PRILOSEC) 20 MG delayed release capsule Take 1 capsule by mouth daily Takes as needed   Yes Thu Araiza MD   imipramine (TOFRANIL) 50 MG tablet Take 1 tablet by mouth nightly 25  Yes Ender Joy MD   magnesium gluconate (MAGONATE) 500 MG tablet Take 1 tablet by mouth 2 times daily   Yes Thu Araiza MD   vitamin C (ASCORBIC ACID) 500 MG tablet Take 1 tablet by mouth daily   Yes Thu Araiza MD   metoprolol succinate

## 2025-02-10 NOTE — ANESTHESIA PROCEDURE NOTES
Peripheral Block    Patient location during procedure: pre-op  Reason for block: post-op pain management and at surgeon's request  Start time: 2/10/2025 12:27 PM  End time: 2/10/2025 12:30 PM  Staffing  Performed: anesthesiologist   Anesthesiologist: Lamine Singh MD  Performed by: Lamine Singh MD  Authorized by: Lamine Singh MD    Preanesthetic Checklist  Completed: patient identified, IV checked, site marked, risks and benefits discussed, surgical/procedural consents, equipment checked, pre-op evaluation, timeout performed, anesthesia consent given, oxygen available, monitors applied/VS acknowledged, fire risk safety assessment completed and verbalized and blood product R/B/A discussed and consented  Peripheral Block   Patient position: supine  Prep: ChloraPrep  Provider prep: mask and sterile gloves  Patient monitoring: cardiac monitor, continuous pulse ox, frequent blood pressure checks, IV access, oxygen and responsive to questions  Block type: TAP  Laterality: bilateral  Injection technique: single-shot  Guidance: ultrasound guided    Needle   Needle type: insulated echogenic nerve stimulator needle   Needle gauge: 20 G  Needle localization: anatomical landmarks and ultrasound guidance  Needle length: 4 IN.  Assessment   Injection assessment: negative aspiration for heme, no paresthesia on injection, local visualized surrounding nerve on ultrasound and no intravascular symptoms  Paresthesia pain: none  Slow fractionated injection: yes  Hemodynamics: stable  Outcomes: uncomplicated and patient tolerated procedure well    Medications Administered  BUPivacaine (MARCAINE) PF injection 0.25% - Perineural   80 mL - 2/10/2025 12:30:00 PM  dexAMETHasone (DECADRON) (PF) 10 mg/mL injection - Other   10 mg - 2/10/2025 12:30:00 PM

## 2025-02-10 NOTE — ANESTHESIA POSTPROCEDURE EVALUATION
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    Alana Phase I: Alana Score: 9    Alana Phase II:      Anesthesia Post Evaluation    Patient location during evaluation: PACU  Patient participation: complete - patient participated  Level of consciousness: awake and alert  Airway patency: patent  Nausea & Vomiting: no nausea and no vomiting  Cardiovascular status: hemodynamically stable  Respiratory status: room air and spontaneous ventilation  Hydration status: euvolemic  Multimodal analgesia pain management approach  Pain management: adequate    No notable events documented.

## 2025-02-10 NOTE — DISCHARGE INSTRUCTIONS
catheter.   I can tell you what I will do if my urine stops flowing or there is a burning or painful feeling in my bladder.                                                       11       At Home Voiding Trial & Intraurethral Denson Catheter Discontinuation Instructions       After your operation, you may experience swelling around the bladder and urethra.  Swelling may limit the ability to pass urine naturally through your urethra for several days, thus requiring a temporary Intraurethral Catheter (IUC). If postoperative recovery proceeds in an uncomplicated manner, an at home Voiding Trial (VT) will allow you to easily remove your IUC at home before your 1st postoperative visit to the office. The IUC may be removed on different days depending on surgical intensity to give swelling enough time to resolve. There is the rare chance an IUC may be removed & have to be reinserted if a patient cannot void.    If you wake up with an IUC or fail a postop Voiding Trial (VT), you will be sent home with an IUC WITH CLAMP, DAY & NIGHT BAG, & MEASURING HAT.      1. The postop VT allows you to void once postoperatively. If you void 150-200mls, you pass! Otherwise it is best to go home with an IUC.  2. During the day, clamp the IUC for 3-4 hrs. to comfort, then drain into day bag and repeat.   3. The IUC may be changed to the larger night bag for drainage & sleep.    An at home VT may be done in / for:    DAY 1       __X__ Outpatient gyn surgeries, transurethral bulking injections, incontinence slings, vaginal mesh excisions, & routine hysterectomies    DAY 3:      ____ Site specific cystocele, rectocele, enterocele, vaginal vault repairs, & robotic colpopexies / hysteropexy    DAY 7:    ____ Larger combined reconstructive procedures (ex: hysterectomy with repairs), Watson urethropexy, LeFort colpocleisis    DO NOT    ____ Fistula repairs, urethral reconstructions, & complex bladder repairs involving opening up the

## 2025-02-11 ENCOUNTER — TELEPHONE (OUTPATIENT)
Age: 65
End: 2025-02-11

## 2025-02-11 NOTE — TELEPHONE ENCOUNTER
Pt called with output resuts, Catheter removed @ 1030 AM and output of 100 ml's, spoke with Dr Ortega, encourage pt to continue to void every hour. Pt has not used ibuprofen, encouraged pt to do so to help with inflammation and if and problems with voiding can call on call or go to ER. Asked pt to call in the morning and update on voiding results.

## 2025-02-13 NOTE — PROGRESS NOTES
[facility], [facility] UROGYNECOLOGY AND PELVIC REHABILITATION   [address] [place] [address]  [address] [place] [address]  Dept: [phone]   Patient:  Ceci Marks   :  1960   Visit Date:  2025     CC: 1-2 week postoperative exam. had concerns including Post-Op Check (Patient c/o burning in general In the vaginal area. Patient feels like she may have a yeast infection from the ATB).    Chaperone present for entire visit and pertinent physical exam: None Required    HPI: Pt feeling fairly well, ibuprofen twice daily eases discomfort, took MOM yesterday, had formed soft BM.    Status post: Robotic Sacral Colpopexy With Restorelle Mesh Altis Bladder Sling Insertion And Cystoscopy   Date: 2/10/2025     SIGNIFICANT FINDINGS:       ROS:  Review of Systems   All other systems reviewed and are negative.      All other systems negative.    PHYSICAL EXAM:  /79 (Site: Right Upper Arm, Position: Sitting, Cuff Size: Large Adult)   Pulse 74   Temp 98.3 °F (36.8 °C) (Oral)   Wt 81.2 kg (179 lb)   SpO2 98%   BMI 28.04 kg/m²   Vitals and signs or symptoms of UTI or infection were assessed. The presence of fevers, nausea, vomiting, chest pain, shortness of breath, or calf pain were evaluated.  Postoperative pain was evaluated.  Bowel and bladder habits were reviewed.  Any remaining vaginal discharge and/or bleeding were confirmed. Medications and compliance were reviewed.    Physical Exam  Constitutional:       Appearance: Normal appearance. She is normal weight.   HENT:      Head: Normocephalic and atraumatic.      Right Ear: Tympanic membrane normal.      Left Ear: Tympanic membrane normal.      Nose: Nose normal.      Mouth/Throat:      Mouth: Mucous membranes are moist.      Pharynx: Oropharynx is clear.   Eyes:      Extraocular Movements: Extraocular movements intact.      Conjunctiva/sclera: Conjunctivae normal.   Cardiovascular:      Rate and Rhythm:

## 2025-02-14 ENCOUNTER — TELEPHONE (OUTPATIENT)
Dept: OBGYN | Age: 65
End: 2025-02-14

## 2025-02-14 DIAGNOSIS — Z09 POSTOP CHECK: Primary | ICD-10-CM

## 2025-02-14 NOTE — TELEPHONE ENCOUNTER
Date:  2025   Time:  11:15 AM   Patient:  Ceci Marks   :  1960   Procedure: Robotic Sacral Colpopexy With Restorelle Mesh Altis Bladder Sling Insertion And Cystoscopy   Procedure Date: 2/10/2025   Post-Op visit Scheduled for follow up? 25 at 915 AM        Comments: Left detailed message informing patient to call if any concerns or questions. The patient has a standing postop appointment and was informed she may call to reconfirm or change it as necessary. The patient was informed how to reach the on call

## 2025-02-17 ENCOUNTER — OFFICE VISIT (OUTPATIENT)
Age: 65
End: 2025-02-17
Payer: COMMERCIAL

## 2025-02-17 VITALS
WEIGHT: 179 LBS | TEMPERATURE: 98.3 F | OXYGEN SATURATION: 98 % | SYSTOLIC BLOOD PRESSURE: 131 MMHG | DIASTOLIC BLOOD PRESSURE: 79 MMHG | HEART RATE: 74 BPM | BODY MASS INDEX: 28.04 KG/M2

## 2025-02-17 DIAGNOSIS — Z98.890 POSTOPERATIVE STATE: Primary | ICD-10-CM

## 2025-02-17 DIAGNOSIS — N76.2 ACUTE VULVITIS: ICD-10-CM

## 2025-02-17 DIAGNOSIS — R33.9 RETENTION OF URINE: ICD-10-CM

## 2025-02-17 LAB
BILIRUBIN, POC: ABNORMAL
BLOOD URINE, POC: 50
CLARITY, POC: CLEAR
COLOR, POC: YELLOW
GLUCOSE URINE, POC: ABNORMAL MG/DL
KETONES, POC: ABNORMAL MG/DL
LEUKOCYTE EST, POC: 25
NITRITE, POC: ABNORMAL
PH, POC: 6
PROTEIN, POC: ABNORMAL MG/DL
SPECIFIC GRAVITY, POC: 1.02
UROBILINOGEN, POC: ABNORMAL MG/DL

## 2025-02-17 PROCEDURE — 51798 US URINE CAPACITY MEASURE: CPT | Performed by: NURSE PRACTITIONER

## 2025-02-17 PROCEDURE — 99024 POSTOP FOLLOW-UP VISIT: CPT | Performed by: NURSE PRACTITIONER

## 2025-02-17 PROCEDURE — 81003 URINALYSIS AUTO W/O SCOPE: CPT | Performed by: NURSE PRACTITIONER

## 2025-02-17 RX ORDER — NYSTATIN AND TRIAMCINOLONE ACETONIDE 100000; 1 [USP'U]/G; MG/G
CREAM TOPICAL 2 TIMES DAILY
Qty: 30 G | Refills: 1 | Status: SHIPPED | OUTPATIENT
Start: 2025-02-17 | End: 2025-03-03

## 2025-02-17 NOTE — PROGRESS NOTES
One week post-op    Surgery Type:   ROBOTIC SACRAL COLPOPEXY WITH RESTORELLE MESH ALTIS BLADDER SLING INSERTION AND CYSTOSCOPY     Surgery Date: 02/10/2025    Date urinary cathter was pulled\" Tues 02/11/2025  Voiding w/o difficulty:yes    Level of pain: 3  Pain medication;   Ibuprofen: 600mg  Tylenol:  na  Norco: na , date of last dose: na  Percocet:  na , date of last dose: na    Stool softener: taking, M.O.M  Last bowel movement: 02/16/2025  Bowel movement description: soft and formed    Antibiotics given: yes  Antibiotics completed: yes    Did patient use:   Simethicone no  Flomax no    Vaginal bleeding: no  Discharge: yes,

## 2025-03-24 NOTE — PROGRESS NOTES
[facility], [facility] UROGYNECOLOGY AND PELVIC REHABILITATION   [address] [place] [address]  Dept: [phone]   Patient:  Ceci Marks    :  1960   Visit Date:  3/25/2025     VISIT - FINAL OUTPATIENT POST OP EXAM  CC: The patient presents for a final outpatient postoperative exam. had concerns including Post-Op Check (6 Wk PO - Going well till a week ago Friday and was at a family members house and missed a step and velma everything, has been sore for a couple days. Stuggleing with constipation- metaucil, MOM, prune juice ).    Chaperone present for entire visit and pertinent physical exam: Resident    Will send today's note to PCP / referral provider.    HPI:   History of Present Illness  The patient presents for evaluation of post-surgical status.    A satisfactory recovery following recent surgery is reported. Adherence to the lifting restriction of not exceeding 5 pounds has been challenging. Advice is sought on whether it is permissible to operate a riding mower and engage in bending activities. Inquiry is made about the possibility of resuming sexual activity. No vaginal bleeding or urinary leakage is reported. Concern is expressed about potential residual material in the abdomen, which cannot be visually confirmed. Constipation is managed with fruit juice, as Metamucil has proven ineffective, necessitating the use of milk of magnesia.    Approximately one week ago, an incident occurred where she inadvertently stepped down forcefully on an unfamiliar step, resulting in a jolt to the body. This event was accompanied by a sensation of nausea.       Status post: Robotic Sacral Colpopexy With Restorelle Mesh Altis Bladder Sling Insertion And Cystoscopy   Date: 2/10/2025   Pathology benign. Significant findings: None  Satisfaction: very satisfied    Vitals and signs or symptoms of UTI or infection were assessed. The presence of

## 2025-03-25 ENCOUNTER — OFFICE VISIT (OUTPATIENT)
Age: 65
End: 2025-03-25

## 2025-03-25 VITALS
HEART RATE: 73 BPM | OXYGEN SATURATION: 100 % | DIASTOLIC BLOOD PRESSURE: 76 MMHG | BODY MASS INDEX: 28.51 KG/M2 | SYSTOLIC BLOOD PRESSURE: 120 MMHG | WEIGHT: 182 LBS | TEMPERATURE: 98.7 F

## 2025-03-25 DIAGNOSIS — Z98.890 POSTOPERATIVE STATE: Primary | ICD-10-CM

## 2025-03-25 DIAGNOSIS — N31.8 FREQUENCY-URGENCY SYNDROME: ICD-10-CM

## 2025-03-25 DIAGNOSIS — R33.9 RETENTION OF URINE: ICD-10-CM

## 2025-03-25 DIAGNOSIS — N95.2 VAGINAL ATROPHY: ICD-10-CM

## 2025-03-25 DIAGNOSIS — K59.01 SLOW TRANSIT CONSTIPATION: ICD-10-CM

## 2025-03-25 PROCEDURE — 99024 POSTOP FOLLOW-UP VISIT: CPT | Performed by: OBSTETRICS & GYNECOLOGY

## 2025-03-25 NOTE — PATIENT INSTRUCTIONS
[facility] UROGYNECOLOGY & PELVIC REHABILITATION    SPECIAL RECIPE    Constipation could be a cause of your bladder control problems.  When the rectum is full of stool, it may disturb the bladder and cause the sensation of urgency and frequency.  If you have a history of constipation or have recently become constipated, see your health care provider.  Constipation may be caused by the medicines you are taking, a “sluggish bowel,” or other conditions.  Most people in Western society should add more bulk to their diet in the form of a high-fiber diet, fiber additives, or bulking agents.  Discuss your need for fiber with your health care provider.  When you add fiber to your diet, it is important not to restrict your fluid intake.  Also, you may note that you feel bloated in the beginning.  This discomfort will be temporary.      If you are constipated, this special recipe will help:     1 cup applesauce  1 cup oat bran  ¼ cup prune juice  Spices as desired (nutmeg, cinnamon)    This recipe may be stored in your refrigerator or in the freezer.  Pre-measured servings may be frozen in sectioned ice cube trays, and thawed as needed.  Begin with two tablespoons each evening followed by one 6-8 ounces glass of water or juice.   After 7-10 days increase this to three tablespoons.  At the end of the second to third week increase it to four tablespoons.  You should begin to see an improvement in your bowel habits in two weeks.  You should make this a part of your daily routine for your lifetime.  It is good for you!    When you begin using the special recipe, remember it is high fiber.  You may be troubled by gas and bloating, but this should go away in several weeks.    Obesity is a dangerous health problem.  It also contributes to incontinence in females.  Some women notice improved bladder control when they lose weight.    Cigarette smoking is irritating to the bladder surface.  Smoking is also associated with bladder

## 2025-08-15 ENCOUNTER — OFFICE VISIT (OUTPATIENT)
Dept: PRIMARY CARE CLINIC | Age: 65
End: 2025-08-15

## 2025-08-15 VITALS
OXYGEN SATURATION: 97 % | DIASTOLIC BLOOD PRESSURE: 78 MMHG | SYSTOLIC BLOOD PRESSURE: 122 MMHG | WEIGHT: 181 LBS | BODY MASS INDEX: 28.41 KG/M2 | HEIGHT: 67 IN | HEART RATE: 73 BPM

## 2025-08-15 DIAGNOSIS — Z13.220 ENCOUNTER FOR LIPID SCREENING FOR CARDIOVASCULAR DISEASE: ICD-10-CM

## 2025-08-15 DIAGNOSIS — I47.29 NONSUSTAINED VENTRICULAR TACHYCARDIA (HCC): ICD-10-CM

## 2025-08-15 DIAGNOSIS — Z13.6 ENCOUNTER FOR LIPID SCREENING FOR CARDIOVASCULAR DISEASE: ICD-10-CM

## 2025-08-15 DIAGNOSIS — Z12.31 ENCOUNTER FOR SCREENING MAMMOGRAM FOR MALIGNANT NEOPLASM OF BREAST: ICD-10-CM

## 2025-08-15 DIAGNOSIS — Z00.00 ANNUAL PHYSICAL EXAM: ICD-10-CM

## 2025-08-15 DIAGNOSIS — R04.0 EPISTAXIS: ICD-10-CM

## 2025-08-15 DIAGNOSIS — Z12.11 ENCOUNTER FOR SCREENING FOR MALIGNANT NEOPLASM OF COLON: Primary | ICD-10-CM

## 2025-08-15 DIAGNOSIS — R53.83 FATIGUE, UNSPECIFIED TYPE: ICD-10-CM

## 2025-08-15 RX ORDER — PANTOPRAZOLE SODIUM 40 MG/1
40 TABLET, DELAYED RELEASE ORAL
Qty: 90 TABLET | Refills: 3 | Status: SHIPPED | OUTPATIENT
Start: 2025-08-15

## 2025-08-15 ASSESSMENT — ENCOUNTER SYMPTOMS
VOMITING: 0
COUGH: 0
EYE REDNESS: 0
DIARRHEA: 0
RHINORRHEA: 0
SORE THROAT: 0
WHEEZING: 0
ABDOMINAL PAIN: 1
NAUSEA: 0
SHORTNESS OF BREATH: 0
EYE DISCHARGE: 0

## 2025-08-22 LAB
ALBUMIN: 4.5 G/DL (ref 3.5–5.2)
ALK PHOSPHATASE: 100 U/L (ref 30–134)
ALT SERPL-CCNC: 28 U/L (ref 5–33)
AST SERPL-CCNC: 28 U/L (ref 9–40)
BASOPHILS ABSOLUTE: 0.03 K/UL (ref 0–0.2)
BASOPHILS RELATIVE PERCENT: 0.8 % (ref 0–2)
BILIRUB SERPL-MCNC: 0.8 MG/DL
BILIRUBIN DIRECT: 0.2 MG/DL
BUN BLDV-MCNC: 9 MG/DL (ref 8–23)
CALCIUM SERPL-MCNC: 9.2 MG/DL (ref 8.6–10.5)
CHLORIDE BLD-SCNC: 100 MMOL/L (ref 96–107)
CHOLESTEROL, TOTAL: 211 MG/DL (ref 100–199)
CHOLESTEROL/HDL RATIO: 5.4 (ref 2–4.5)
CO2: 23 MMOL/L (ref 18–32)
CREAT SERPL-MCNC: 0.89 MG/DL (ref 0.51–1.15)
EGFR IF NONAFRICAN AMERICAN: 72 ML/MIN/1.73M2
EOSINOPHILS ABSOLUTE: 0.08 K/UL (ref 0–0.8)
EOSINOPHILS RELATIVE PERCENT: 2.1 % (ref 0–5)
GLUCOSE: 108 MG/DL (ref 70–100)
HCT VFR BLD CALC: 38.9 % (ref 35–47)
HDLC SERPL-MCNC: 39 MG/DL
HEMOGLOBIN: 13.5 G/DL (ref 11.9–16)
IMMATURE GRANS (ABS): 0 K/UL (ref 0–0.06)
IMMATURE GRANULOCYTES %: 0 % (ref 0–2)
LDL CHOLESTEROL: 146 MG/DL
LDL/HDL RATIO: 3.7
LYMPHOCYTES ABSOLUTE: 1.31 K/UL (ref 0.9–5.2)
LYMPHOCYTES RELATIVE PERCENT: 35 % (ref 20–45)
MAGNESIUM: 2.1 MG/DL (ref 1.6–2.6)
MCH RBC QN AUTO: 31.8 PG (ref 26–33)
MCHC RBC AUTO-ENTMCNC: 34.7 G/DL (ref 31–36)
MCV RBC AUTO: 92 FL (ref 75–100)
MONOCYTES ABSOLUTE: 0.29 K/UL (ref 0.1–1)
MONOCYTES RELATIVE PERCENT: 7.8 % (ref 0–13)
NEUTROPHILS ABSOLUTE: 2.03 K/UL (ref 1.9–8)
NEUTROPHILS RELATIVE PERCENT: 54.3 % (ref 45–75)
PDW BLD-RTO: 12.5 % (ref 11.2–14.8)
PLATELET # BLD: 207 THOUS/CMM (ref 140–440)
POTASSIUM SERPL-SCNC: 4.5 MMOL/L (ref 3.5–5.4)
RBC # BLD: 4.25 MILL/CMM (ref 3.8–5.2)
SODIUM BLD-SCNC: 136 MMOL/L (ref 135–148)
T4 FREE: 1.06 NG/DL (ref 0.8–1.9)
TOTAL PROTEIN: 7.4 G/DL (ref 6–8.3)
TRIGL SERPL-MCNC: 128 MG/DL (ref 20–149)
TSH SERPL DL<=0.05 MIU/L-ACNC: 2.26 UIU/ML (ref 0.27–4.2)
VLDLC SERPL CALC-MCNC: 26 MG/DL
WBC # BLD: 3.7 THDS/CMM (ref 3.6–11)

## (undated) DEVICE — SEAL

## (undated) DEVICE — Device

## (undated) DEVICE — SOLUTION IRRIG 1000ML 0.9% SOD CHL USP POUR PLAS BTL

## (undated) DEVICE — DEVICE TRCR 12X9X3IN WHT CLSR DISP OMNICLOSE

## (undated) DEVICE — MHPB GYN ROBOTIC PACK: Brand: MEDLINE INDUSTRIES, INC.

## (undated) DEVICE — TIP COVER ACCESSORY

## (undated) DEVICE — SOLUTION IV 1000ML 0.9% SOD CHL PH 5 INJ USP VIAFLX PLAS

## (undated) DEVICE — TROCAR: Brand: KII FIOS FIRST ENTRY

## (undated) DEVICE — SUTURE VICRYL SZ 2-0 L27IN ABSRB UD L26MM CT-2 1/2 CIR J269H

## (undated) DEVICE — PAD PT POS 36 IN SURGYPAD DISP

## (undated) DEVICE — CATHETER URETH 18FR BLLN 30CC 2 W F INF CTRL BARDX

## (undated) DEVICE — SUTURE VICRYL + SZ 0 L27IN ABSRB VLT L26MM UR-6 5/8 CIR VCP603H

## (undated) DEVICE — SYRINGE, LUER LOCK, 10ML: Brand: MEDLINE

## (undated) DEVICE — SUTURE MONOCRYL + SZ 4-0 L27IN ABSRB UD L19MM PS-2 3/8 CIR MCP426H

## (undated) DEVICE — APPLIER SUT CLP FOR 2-0 3-0 4-0 VCRL ABSRB SUT

## (undated) DEVICE — UNDERPANTS MAT L XL SEAMLESS CLR CODE WAISTBAND KNIT

## (undated) DEVICE — INSUFFLATION NEEDLE TO ESTABLISH PNEUMOPERITONEUM.: Brand: INSUFFLATION NEEDLE

## (undated) DEVICE — BLADELESS OBTURATOR: Brand: WECK VISTA

## (undated) DEVICE — ARM DRAPE

## (undated) DEVICE — CATHETER URETH 16FR BLLN 5CC SIL ALLY W/ SIL HYDRGEL 2 W F

## (undated) DEVICE — SUTURE VICRYL + SZ 1 L36IN ABSRB UD L36MM CT-1 1/2 CIR VCP947H

## (undated) DEVICE — PAD,SANITARY,11 IN,MAXI,W/WINGS,N-STRL: Brand: MEDLINE

## (undated) DEVICE — GLOVE ORANGE PI 7 1/2   MSG9075

## (undated) DEVICE — SNAPSECURE FOLEY DEVICE: Brand: MEDLINE

## (undated) DEVICE — DRAINBAG,ANTI-REFLUX TOWER,L/F,2000ML,LL: Brand: MEDLINE

## (undated) DEVICE — SUTURE VICRYL + SZ 2-0 L27IN ABSRB WHT SH 1/2 CIR TAPERCUT VCP417H